# Patient Record
Sex: MALE | Race: WHITE | Employment: OTHER | URBAN - METROPOLITAN AREA
[De-identification: names, ages, dates, MRNs, and addresses within clinical notes are randomized per-mention and may not be internally consistent; named-entity substitution may affect disease eponyms.]

---

## 2017-11-21 ENCOUNTER — HOSPITAL ENCOUNTER (OUTPATIENT)
Age: 77
Discharge: HOME | End: 2017-11-21
Payer: MEDICARE

## 2017-11-21 ENCOUNTER — HOSPITAL ENCOUNTER (OUTPATIENT)
Dept: VASCULAR LAB | Age: 77
Discharge: HOME | End: 2017-11-21
Payer: MEDICARE

## 2017-11-21 LAB
ABSOLUTE EOS #: 0.2 K/UL (ref 0–0.4)
ABSOLUTE IMMATURE GRANULOCYTE: ABNORMAL K/UL (ref 0–0.3)
ABSOLUTE LYMPH #: 0.8 K/UL (ref 1–4.8)
ABSOLUTE MONO #: 0.6 K/UL (ref 0.1–1.3)
ABSOLUTE RETIC #: 0.02 M/UL (ref 0.02–0.1)
ANION GAP SERPL CALCULATED.3IONS-SCNC: 13 MMOL/L (ref 9–17)
BASOPHILS # BLD: 1 %
BASOPHILS ABSOLUTE: 0.1 K/UL (ref 0–0.2)
BNP INTERPRETATION: ABNORMAL
BUN BLDV-MCNC: 25 MG/DL (ref 8–23)
CHLORIDE BLD-SCNC: 100 MMOL/L (ref 98–107)
CO2: 28 MMOL/L (ref 20–31)
CREAT SERPL-MCNC: 1.43 MG/DL (ref 0.7–1.2)
DIFFERENTIAL TYPE: ABNORMAL
EOSINOPHILS RELATIVE PERCENT: 3 %
FERRITIN: 51 UG/L (ref 30–400)
FOLATE: 15 NG/ML
GFR AFRICAN AMERICAN: 58 ML/MIN
GFR NON-AFRICAN AMERICAN: 48 ML/MIN
GFR SERPL CREATININE-BSD FRML MDRD: ABNORMAL ML/MIN/{1.73_M2}
GFR SERPL CREATININE-BSD FRML MDRD: ABNORMAL ML/MIN/{1.73_M2}
HCT VFR BLD CALC: 34.6 % (ref 41–53)
HEMOGLOBIN: 11.7 G/DL (ref 13.5–17.5)
IMMATURE GRANULOCYTES: ABNORMAL %
IMMATURE RETIC FRACT: ABNORMAL %
INR BLD: 4.5
IRON SATURATION: 13 % (ref 20–55)
IRON: 39 UG/DL (ref 59–158)
LYMPHOCYTES # BLD: 11 %
MCH RBC QN AUTO: 30.8 PG (ref 26–34)
MCHC RBC AUTO-ENTMCNC: 33.9 G/DL (ref 31–37)
MCV RBC AUTO: 91.1 FL (ref 80–100)
MONOCYTES # BLD: 9 %
PDW BLD-RTO: 16.1 % (ref 11.5–14.9)
PLATELET # BLD: 167 K/UL (ref 150–450)
PLATELET ESTIMATE: ABNORMAL
PMV BLD AUTO: 8.4 FL (ref 6–12)
POTASSIUM SERPL-SCNC: 4.6 MMOL/L (ref 3.7–5.3)
PRO-BNP: 1884 PG/ML
PROTHROMBIN TIME: 52.9 SEC (ref 9.7–12)
RBC # BLD: 3.8 M/UL (ref 4.5–5.9)
RBC # BLD: ABNORMAL 10*6/UL
RETIC %: 0.5 % (ref 0.5–2)
RETIC HEMOGLOBIN: ABNORMAL PG (ref 28.2–35.7)
SEG NEUTROPHILS: 76 %
SEGMENTED NEUTROPHILS ABSOLUTE COUNT: 5.7 K/UL (ref 1.3–9.1)
SODIUM BLD-SCNC: 141 MMOL/L (ref 135–144)
THYROXINE, FREE: 1.44 NG/DL (ref 0.93–1.7)
TOTAL IRON BINDING CAPACITY: 301 UG/DL (ref 250–450)
TSH SERPL DL<=0.05 MIU/L-ACNC: 2.86 MIU/L (ref 0.3–5)
UNSATURATED IRON BINDING CAPACITY: 262 UG/DL (ref 112–347)
VITAMIN B-12: 806 PG/ML (ref 211–946)
WBC # BLD: 7.4 K/UL (ref 3.5–11)
WBC # BLD: ABNORMAL 10*3/UL

## 2017-11-21 PROCEDURE — 84520 ASSAY OF UREA NITROGEN: CPT

## 2017-11-21 PROCEDURE — 85045 AUTOMATED RETICULOCYTE COUNT: CPT

## 2017-11-21 PROCEDURE — 93978 VASCULAR STUDY: CPT

## 2017-11-21 PROCEDURE — 84443 ASSAY THYROID STIM HORMONE: CPT

## 2017-11-21 PROCEDURE — 82607 VITAMIN B-12: CPT

## 2017-11-21 PROCEDURE — 80051 ELECTROLYTE PANEL: CPT

## 2017-11-21 PROCEDURE — 82746 ASSAY OF FOLIC ACID SERUM: CPT

## 2017-11-21 PROCEDURE — 82728 ASSAY OF FERRITIN: CPT

## 2017-11-21 PROCEDURE — 83550 IRON BINDING TEST: CPT

## 2017-11-21 PROCEDURE — 85610 PROTHROMBIN TIME: CPT

## 2017-11-21 PROCEDURE — 85025 COMPLETE CBC W/AUTO DIFF WBC: CPT

## 2017-11-21 PROCEDURE — 83540 ASSAY OF IRON: CPT

## 2017-11-21 PROCEDURE — 36415 COLL VENOUS BLD VENIPUNCTURE: CPT

## 2017-11-21 PROCEDURE — 84439 ASSAY OF FREE THYROXINE: CPT

## 2017-11-21 PROCEDURE — 82565 ASSAY OF CREATININE: CPT

## 2017-11-21 PROCEDURE — 83880 ASSAY OF NATRIURETIC PEPTIDE: CPT

## 2017-11-28 ENCOUNTER — HOSPITAL ENCOUNTER (OUTPATIENT)
Age: 77
Discharge: HOME | End: 2017-11-28
Payer: MEDICARE

## 2017-11-28 LAB
INR BLD: 6
PROTHROMBIN TIME: 72 SEC (ref 9.7–12)

## 2017-11-28 PROCEDURE — 85610 PROTHROMBIN TIME: CPT

## 2017-11-28 PROCEDURE — 36415 COLL VENOUS BLD VENIPUNCTURE: CPT

## 2017-11-29 ENCOUNTER — HOSPITAL ENCOUNTER (OUTPATIENT)
Age: 77
Discharge: HOME | End: 2017-11-29
Payer: MEDICARE

## 2017-11-29 LAB
ABSOLUTE EOS #: 0.2 K/UL (ref 0–0.4)
ABSOLUTE IMMATURE GRANULOCYTE: ABNORMAL K/UL (ref 0–0.3)
ABSOLUTE LYMPH #: 0.8 K/UL (ref 1–4.8)
ABSOLUTE MONO #: 0.7 K/UL (ref 0.1–1.3)
BASOPHILS # BLD: 0 % (ref 0–2)
BASOPHILS ABSOLUTE: 0 K/UL (ref 0–0.2)
DIFFERENTIAL TYPE: ABNORMAL
EOSINOPHILS RELATIVE PERCENT: 2 % (ref 0–4)
HCT VFR BLD CALC: 33.3 % (ref 41–53)
HEMOGLOBIN: 11.2 G/DL (ref 13.5–17.5)
IMMATURE GRANULOCYTES: ABNORMAL %
INR BLD: 1.9
LYMPHOCYTES # BLD: 11 % (ref 24–44)
MCH RBC QN AUTO: 30.7 PG (ref 26–34)
MCHC RBC AUTO-ENTMCNC: 33.5 G/DL (ref 31–37)
MCV RBC AUTO: 91.6 FL (ref 80–100)
MONOCYTES # BLD: 10 % (ref 1–7)
PDW BLD-RTO: 16.3 % (ref 11.5–14.9)
PLATELET # BLD: 181 K/UL (ref 150–450)
PLATELET ESTIMATE: ABNORMAL
PMV BLD AUTO: 8.5 FL (ref 6–12)
PROTHROMBIN TIME: 21.3 SEC (ref 9.7–12)
RBC # BLD: 3.64 M/UL (ref 4.5–5.9)
RBC # BLD: ABNORMAL 10*6/UL
SEG NEUTROPHILS: 77 % (ref 36–66)
SEGMENTED NEUTROPHILS ABSOLUTE COUNT: 5.8 K/UL (ref 1.3–9.1)
WBC # BLD: 7.6 K/UL (ref 3.5–11)
WBC # BLD: ABNORMAL 10*3/UL

## 2017-11-29 PROCEDURE — 85025 COMPLETE CBC W/AUTO DIFF WBC: CPT

## 2017-11-29 PROCEDURE — 36415 COLL VENOUS BLD VENIPUNCTURE: CPT

## 2017-11-29 PROCEDURE — 85610 PROTHROMBIN TIME: CPT

## 2017-12-01 ENCOUNTER — HOSPITAL ENCOUNTER (OUTPATIENT)
Age: 77
Discharge: HOME | End: 2017-12-01
Payer: MEDICARE

## 2017-12-01 LAB
INR BLD: 1.3
PROTHROMBIN TIME: 13.7 SEC (ref 9.7–12)

## 2017-12-01 PROCEDURE — 85610 PROTHROMBIN TIME: CPT

## 2017-12-01 PROCEDURE — 36415 COLL VENOUS BLD VENIPUNCTURE: CPT

## 2017-12-07 ENCOUNTER — HOSPITAL ENCOUNTER (OUTPATIENT)
Age: 77
Discharge: HOME | End: 2017-12-07
Payer: MEDICARE

## 2017-12-07 LAB
INR BLD: 1.9
PROTHROMBIN TIME: 20.9 SEC (ref 9.7–12)

## 2017-12-07 PROCEDURE — 85610 PROTHROMBIN TIME: CPT

## 2017-12-07 PROCEDURE — 36415 COLL VENOUS BLD VENIPUNCTURE: CPT

## 2017-12-14 ENCOUNTER — HOSPITAL ENCOUNTER (OUTPATIENT)
Age: 77
Discharge: HOME | End: 2017-12-14
Payer: MEDICARE

## 2017-12-14 LAB
INR BLD: 1.3
PROTHROMBIN TIME: 14.3 SEC (ref 9.7–12)

## 2017-12-14 PROCEDURE — 36415 COLL VENOUS BLD VENIPUNCTURE: CPT

## 2017-12-14 PROCEDURE — 85610 PROTHROMBIN TIME: CPT

## 2017-12-21 ENCOUNTER — HOSPITAL ENCOUNTER (OUTPATIENT)
Age: 77
Discharge: HOME | End: 2017-12-21
Payer: MEDICARE

## 2017-12-21 LAB
INR BLD: 1.4
PROTHROMBIN TIME: 15.7 SEC (ref 9.7–12)

## 2017-12-21 PROCEDURE — 85610 PROTHROMBIN TIME: CPT

## 2017-12-21 PROCEDURE — 36415 COLL VENOUS BLD VENIPUNCTURE: CPT

## 2017-12-26 ENCOUNTER — HOSPITAL ENCOUNTER (OUTPATIENT)
Age: 77
Discharge: HOME | End: 2017-12-26
Payer: MEDICARE

## 2017-12-26 LAB
INR BLD: 2.7
PROTHROMBIN TIME: 31 SEC (ref 9.7–12)

## 2017-12-26 PROCEDURE — 85610 PROTHROMBIN TIME: CPT

## 2017-12-26 PROCEDURE — 36415 COLL VENOUS BLD VENIPUNCTURE: CPT

## 2018-01-02 ENCOUNTER — HOSPITAL ENCOUNTER (OUTPATIENT)
Age: 78
Discharge: HOME | End: 2018-01-02
Payer: MEDICARE

## 2018-01-02 LAB
INR BLD: 3.8
PROTHROMBIN TIME: 44.6 SEC (ref 9.7–12)

## 2018-01-02 PROCEDURE — 36415 COLL VENOUS BLD VENIPUNCTURE: CPT

## 2018-01-02 PROCEDURE — 85610 PROTHROMBIN TIME: CPT

## 2018-01-09 ENCOUNTER — HOSPITAL ENCOUNTER (OUTPATIENT)
Age: 78
Discharge: HOME | End: 2018-01-09
Payer: MEDICARE

## 2018-01-09 LAB
INR BLD: 1.6
PROTHROMBIN TIME: 17.2 SEC (ref 9.7–12)

## 2018-01-09 PROCEDURE — 85610 PROTHROMBIN TIME: CPT

## 2018-01-09 PROCEDURE — 36415 COLL VENOUS BLD VENIPUNCTURE: CPT

## 2018-01-16 ENCOUNTER — HOSPITAL ENCOUNTER (OUTPATIENT)
Age: 78
Discharge: HOME | End: 2018-01-16
Payer: MEDICARE

## 2018-01-16 LAB
INR BLD: 1.6
PROTHROMBIN TIME: 17 SEC (ref 9.7–12)

## 2018-01-16 PROCEDURE — 36415 COLL VENOUS BLD VENIPUNCTURE: CPT

## 2018-01-16 PROCEDURE — 85610 PROTHROMBIN TIME: CPT

## 2018-01-22 ENCOUNTER — HOSPITAL ENCOUNTER (OUTPATIENT)
Age: 78
Discharge: HOME | End: 2018-01-22
Payer: MEDICARE

## 2018-01-22 LAB
INR BLD: 1.5
PROTHROMBIN TIME: 15.6 SEC (ref 9.7–12)

## 2018-01-22 PROCEDURE — 85610 PROTHROMBIN TIME: CPT

## 2018-01-22 PROCEDURE — 36415 COLL VENOUS BLD VENIPUNCTURE: CPT

## 2018-01-30 ENCOUNTER — HOSPITAL ENCOUNTER (OUTPATIENT)
Age: 78
Discharge: HOME | End: 2018-01-30
Payer: MEDICARE

## 2018-01-30 LAB
INR BLD: 2.5
PROTHROMBIN TIME: 24.9 SEC (ref 9.7–12)

## 2018-01-30 PROCEDURE — 36415 COLL VENOUS BLD VENIPUNCTURE: CPT

## 2018-01-30 PROCEDURE — 85610 PROTHROMBIN TIME: CPT

## 2018-02-06 ENCOUNTER — HOSPITAL ENCOUNTER (OUTPATIENT)
Age: 78
Discharge: HOME | End: 2018-02-06
Payer: MEDICARE

## 2018-02-06 LAB
INR BLD: 1.5
PROTHROMBIN TIME: 15.7 SEC (ref 9.7–12)

## 2018-02-06 PROCEDURE — 85610 PROTHROMBIN TIME: CPT

## 2018-02-06 PROCEDURE — 36415 COLL VENOUS BLD VENIPUNCTURE: CPT

## 2018-02-13 ENCOUNTER — HOSPITAL ENCOUNTER (OUTPATIENT)
Age: 78
Discharge: HOME | End: 2018-02-13
Payer: MEDICARE

## 2018-02-13 LAB
INR BLD: 2.9
PROTHROMBIN TIME: 28.8 SEC (ref 9.7–12)

## 2018-02-13 PROCEDURE — 85610 PROTHROMBIN TIME: CPT

## 2018-02-13 PROCEDURE — 36415 COLL VENOUS BLD VENIPUNCTURE: CPT

## 2018-02-21 ENCOUNTER — HOSPITAL ENCOUNTER (OUTPATIENT)
Age: 78
Discharge: HOME | End: 2018-02-21
Payer: MEDICARE

## 2018-02-21 LAB
INR BLD: 2.4
PROTHROMBIN TIME: 24 SEC (ref 9.7–12)

## 2018-02-21 PROCEDURE — 36415 COLL VENOUS BLD VENIPUNCTURE: CPT

## 2018-02-21 PROCEDURE — 85610 PROTHROMBIN TIME: CPT

## 2018-02-27 ENCOUNTER — HOSPITAL ENCOUNTER (OUTPATIENT)
Age: 78
Discharge: HOME | End: 2018-02-27
Payer: MEDICARE

## 2018-02-27 LAB
BUN BLDV-MCNC: 24 MG/DL (ref 8–23)
CREAT SERPL-MCNC: 1.2 MG/DL (ref 0.7–1.2)
GFR AFRICAN AMERICAN: >60 ML/MIN
GFR NON-AFRICAN AMERICAN: 58 ML/MIN
GFR SERPL CREATININE-BSD FRML MDRD: ABNORMAL ML/MIN/{1.73_M2}
GFR SERPL CREATININE-BSD FRML MDRD: ABNORMAL ML/MIN/{1.73_M2}
INR BLD: 1.3
PROTHROMBIN TIME: 13.4 SEC (ref 9.7–12)

## 2018-02-27 PROCEDURE — 84520 ASSAY OF UREA NITROGEN: CPT

## 2018-02-27 PROCEDURE — 36415 COLL VENOUS BLD VENIPUNCTURE: CPT

## 2018-02-27 PROCEDURE — 82565 ASSAY OF CREATININE: CPT

## 2018-02-27 PROCEDURE — 85610 PROTHROMBIN TIME: CPT

## 2018-03-06 ENCOUNTER — HOSPITAL ENCOUNTER (OUTPATIENT)
Age: 78
Discharge: HOME | End: 2018-03-06
Payer: MEDICARE

## 2018-03-06 LAB
INR BLD: 2.2
PROTHROMBIN TIME: 21.7 SEC (ref 9.7–12)

## 2018-03-06 PROCEDURE — 36415 COLL VENOUS BLD VENIPUNCTURE: CPT

## 2018-03-06 PROCEDURE — 85610 PROTHROMBIN TIME: CPT

## 2018-03-13 ENCOUNTER — HOSPITAL ENCOUNTER (OUTPATIENT)
Age: 78
Discharge: HOME | End: 2018-03-13
Payer: MEDICARE

## 2018-03-13 LAB
INR BLD: 4.1
PROTHROMBIN TIME: 40.1 SEC (ref 9.7–12)

## 2018-03-13 PROCEDURE — 36415 COLL VENOUS BLD VENIPUNCTURE: CPT

## 2018-03-13 PROCEDURE — 85610 PROTHROMBIN TIME: CPT

## 2018-03-20 ENCOUNTER — HOSPITAL ENCOUNTER (OUTPATIENT)
Age: 78
Discharge: HOME | End: 2018-03-20
Payer: MEDICARE

## 2018-03-20 LAB
INR BLD: 3
PROTHROMBIN TIME: 29.3 SEC (ref 9.7–12)

## 2018-03-20 PROCEDURE — 36415 COLL VENOUS BLD VENIPUNCTURE: CPT

## 2018-03-20 PROCEDURE — 85610 PROTHROMBIN TIME: CPT

## 2018-04-03 ENCOUNTER — HOSPITAL ENCOUNTER (OUTPATIENT)
Age: 78
Discharge: HOME | End: 2018-04-03
Payer: MEDICARE

## 2018-04-03 LAB
INR BLD: 2.6
PROTHROMBIN TIME: 25.4 SEC (ref 9.7–12)

## 2018-04-03 PROCEDURE — 85610 PROTHROMBIN TIME: CPT

## 2018-04-03 PROCEDURE — 36415 COLL VENOUS BLD VENIPUNCTURE: CPT

## 2018-04-17 ENCOUNTER — HOSPITAL ENCOUNTER (OUTPATIENT)
Age: 78
Discharge: HOME | End: 2018-04-17
Payer: MEDICARE

## 2018-04-17 LAB
INR BLD: 3.2
PROTHROMBIN TIME: 31.4 SEC (ref 9.7–12)

## 2018-04-17 PROCEDURE — 36415 COLL VENOUS BLD VENIPUNCTURE: CPT

## 2018-04-17 PROCEDURE — 85610 PROTHROMBIN TIME: CPT

## 2018-05-08 ENCOUNTER — HOSPITAL ENCOUNTER (OUTPATIENT)
Age: 78
Discharge: HOME | End: 2018-05-08
Payer: MEDICARE

## 2018-05-08 LAB
INR BLD: 1.2
PROTHROMBIN TIME: 12.3 SEC (ref 9.7–12)

## 2018-05-08 PROCEDURE — 36415 COLL VENOUS BLD VENIPUNCTURE: CPT

## 2018-05-08 PROCEDURE — 85610 PROTHROMBIN TIME: CPT

## 2018-05-10 ENCOUNTER — HOSPITAL ENCOUNTER (OUTPATIENT)
Age: 78
Discharge: HOME | End: 2018-05-10
Payer: MEDICARE

## 2018-05-10 LAB
INR BLD: 1.1
PROTHROMBIN TIME: 11.8 SEC (ref 9.7–12)

## 2018-05-10 PROCEDURE — 36415 COLL VENOUS BLD VENIPUNCTURE: CPT

## 2018-05-10 PROCEDURE — 85610 PROTHROMBIN TIME: CPT

## 2018-10-23 ENCOUNTER — HOSPITAL ENCOUNTER (OUTPATIENT)
Age: 78
Setting detail: SPECIMEN
Discharge: HOME | End: 2018-10-23
Payer: MEDICARE

## 2018-10-25 LAB
APPEARANCE FLUID: NORMAL
BASO FLUID: NORMAL %
COLOR FLUID: NORMAL
CRYSTALS, FLUID: POSITIVE
EOSINOPHIL FLUID: NORMAL %
FLUID DIFF COMMENT: NORMAL
LYMPHOCYTES, BODY FLUID: NORMAL %
MONOCYTE, FLUID: NORMAL %
NEUTROPHIL, FLUID: NORMAL %
OTHER CELLS FLUID: NORMAL %
RBC FLUID: NORMAL /MM3
SPECIMEN TYPE: ABNORMAL
SPECIMEN TYPE: NORMAL
WBC FLUID: 2280 /MM3

## 2018-10-28 LAB
CULTURE: ABNORMAL
DIRECT EXAM: ABNORMAL
DIRECT EXAM: ABNORMAL
Lab: ABNORMAL
SPECIMEN DESCRIPTION: ABNORMAL
STATUS: ABNORMAL

## 2018-11-01 ENCOUNTER — HOSPITAL ENCOUNTER (OUTPATIENT)
Dept: NON INVASIVE DIAGNOSTICS | Age: 78
Discharge: HOME | End: 2018-11-01
Payer: MEDICARE

## 2018-11-01 ENCOUNTER — HOSPITAL ENCOUNTER (OUTPATIENT)
Dept: NUCLEAR MEDICINE | Age: 78
Discharge: HOME | End: 2018-11-03
Payer: MEDICARE

## 2018-11-01 VITALS — BODY MASS INDEX: 26.95 KG/M2 | WEIGHT: 210 LBS | HEIGHT: 74 IN

## 2018-11-01 DIAGNOSIS — I10 ESSENTIAL HYPERTENSION, MALIGNANT: ICD-10-CM

## 2018-11-01 DIAGNOSIS — R94.31 ABNORMAL ELECTROCARDIOGRAM: ICD-10-CM

## 2018-11-01 DIAGNOSIS — E78.00 PURE HYPERCHOLESTEROLEMIA: ICD-10-CM

## 2018-11-01 DIAGNOSIS — I48.91 ATRIAL FIBRILLATION, UNSPECIFIED TYPE (HCC): ICD-10-CM

## 2018-11-01 LAB
LV EF: 30 %
LVEF MODALITY: NORMAL

## 2018-11-01 PROCEDURE — 3430000000 HC RX DIAGNOSTIC RADIOPHARMACEUTICAL: Performed by: FAMILY MEDICINE

## 2018-11-01 PROCEDURE — 78452 HT MUSCLE IMAGE SPECT MULT: CPT

## 2018-11-01 PROCEDURE — 6360000002 HC RX W HCPCS: Performed by: FAMILY MEDICINE

## 2018-11-01 PROCEDURE — 2580000003 HC RX 258: Performed by: FAMILY MEDICINE

## 2018-11-01 PROCEDURE — A9500 TC99M SESTAMIBI: HCPCS | Performed by: FAMILY MEDICINE

## 2018-11-01 PROCEDURE — 93017 CV STRESS TEST TRACING ONLY: CPT

## 2018-11-01 RX ORDER — METOPROLOL TARTRATE 5 MG/5ML
2.5 INJECTION INTRAVENOUS PRN
Status: ACTIVE | OUTPATIENT
Start: 2018-11-01 | End: 2018-11-02

## 2018-11-01 RX ORDER — SODIUM CHLORIDE 0.9 % (FLUSH) 0.9 %
10 SYRINGE (ML) INJECTION PRN
Status: DISCONTINUED | OUTPATIENT
Start: 2018-11-01 | End: 2018-11-04 | Stop reason: HOSPADM

## 2018-11-01 RX ORDER — NITROGLYCERIN 0.4 MG/1
0.4 TABLET SUBLINGUAL EVERY 5 MIN PRN
Status: ACTIVE | OUTPATIENT
Start: 2018-11-01 | End: 2018-11-02

## 2018-11-01 RX ORDER — SODIUM CHLORIDE 0.9 % (FLUSH) 0.9 %
10 SYRINGE (ML) INJECTION PRN
Status: ACTIVE | OUTPATIENT
Start: 2018-11-01 | End: 2018-11-02

## 2018-11-01 RX ORDER — AMINOPHYLLINE DIHYDRATE 25 MG/ML
100 INJECTION, SOLUTION INTRAVENOUS
Status: ACTIVE | OUTPATIENT
Start: 2018-11-01 | End: 2018-11-01

## 2018-11-01 RX ORDER — 0.9 % SODIUM CHLORIDE 0.9 %
250 INTRAVENOUS SOLUTION INTRAVENOUS ONCE
Status: DISCONTINUED | OUTPATIENT
Start: 2018-11-01 | End: 2018-11-04 | Stop reason: HOSPADM

## 2018-11-01 RX ADMIN — Medication 10 ML: at 07:29

## 2018-11-01 RX ADMIN — REGADENOSON 0.4 MG: 0.08 INJECTION, SOLUTION INTRAVENOUS at 09:04

## 2018-11-01 RX ADMIN — Medication 10 ML: at 09:04

## 2018-11-01 RX ADMIN — TETRAKIS(2-METHOXYISOBUTYLISOCYANIDE)COPPER(I) TETRAFLUOROBORATE 36.2 MILLICURIE: 1 INJECTION, POWDER, LYOPHILIZED, FOR SOLUTION INTRAVENOUS at 09:05

## 2018-11-01 RX ADMIN — TETRAKIS(2-METHOXYISOBUTYLISOCYANIDE)COPPER(I) TETRAFLUOROBORATE 11.1 MILLICURIE: 1 INJECTION, POWDER, LYOPHILIZED, FOR SOLUTION INTRAVENOUS at 07:29

## 2018-11-01 RX ADMIN — Medication 10 ML: at 08:29

## 2019-09-12 ENCOUNTER — HOSPITAL ENCOUNTER (OUTPATIENT)
Age: 79
Discharge: HOME | End: 2019-09-12
Payer: MEDICARE

## 2019-09-12 LAB
INR BLD: 2
PROTHROMBIN TIME: 22.7 SEC (ref 11.8–14.6)

## 2019-09-12 PROCEDURE — 36415 COLL VENOUS BLD VENIPUNCTURE: CPT

## 2019-09-12 PROCEDURE — 85610 PROTHROMBIN TIME: CPT

## 2019-09-19 ENCOUNTER — HOSPITAL ENCOUNTER (OUTPATIENT)
Age: 79
Discharge: HOME | End: 2019-09-19
Payer: MEDICARE

## 2019-09-19 LAB
INR BLD: 2.3
PROTHROMBIN TIME: 25.8 SEC (ref 11.8–14.6)

## 2019-09-19 PROCEDURE — 85610 PROTHROMBIN TIME: CPT

## 2019-09-19 PROCEDURE — 36415 COLL VENOUS BLD VENIPUNCTURE: CPT

## 2019-10-23 ENCOUNTER — HOSPITAL ENCOUNTER (OUTPATIENT)
Age: 79
Discharge: HOME | End: 2019-10-23
Payer: MEDICARE

## 2019-10-23 ENCOUNTER — HOSPITAL ENCOUNTER (OUTPATIENT)
Dept: ULTRASOUND IMAGING | Age: 79
Discharge: HOME | End: 2019-10-25
Payer: MEDICARE

## 2019-10-23 DIAGNOSIS — R10.11 RUQ PAIN: ICD-10-CM

## 2019-10-23 LAB
ABSOLUTE EOS #: 0.1 K/UL (ref 0–0.4)
ABSOLUTE IMMATURE GRANULOCYTE: ABNORMAL K/UL (ref 0–0.3)
ABSOLUTE LYMPH #: 1.1 K/UL (ref 1–4.8)
ABSOLUTE MONO #: 0.7 K/UL (ref 0.1–1.3)
ALBUMIN SERPL-MCNC: 3.8 G/DL (ref 3.5–5.2)
ALBUMIN/GLOBULIN RATIO: ABNORMAL (ref 1–2.5)
ALP BLD-CCNC: 89 U/L (ref 40–129)
ALT SERPL-CCNC: 10 U/L (ref 5–41)
AMYLASE: 42 U/L (ref 28–100)
ANION GAP SERPL CALCULATED.3IONS-SCNC: 12 MMOL/L (ref 9–17)
AST SERPL-CCNC: 13 U/L
BASOPHILS # BLD: 1 % (ref 0–2)
BASOPHILS ABSOLUTE: 0.1 K/UL (ref 0–0.2)
BILIRUB SERPL-MCNC: 0.86 MG/DL (ref 0.3–1.2)
BILIRUBIN DIRECT: 0.32 MG/DL
BILIRUBIN, INDIRECT: 0.54 MG/DL (ref 0–1)
BUN BLDV-MCNC: 24 MG/DL (ref 8–23)
BUN/CREAT BLD: ABNORMAL (ref 9–20)
CALCIUM SERPL-MCNC: 9.7 MG/DL (ref 8.6–10.4)
CHLORIDE BLD-SCNC: 101 MMOL/L (ref 98–107)
CO2: 24 MMOL/L (ref 20–31)
CREAT SERPL-MCNC: 1.23 MG/DL (ref 0.7–1.2)
DIFFERENTIAL TYPE: ABNORMAL
EOSINOPHILS RELATIVE PERCENT: 1 % (ref 0–4)
GFR AFRICAN AMERICAN: >60 ML/MIN
GFR NON-AFRICAN AMERICAN: 56 ML/MIN
GFR SERPL CREATININE-BSD FRML MDRD: ABNORMAL ML/MIN/{1.73_M2}
GFR SERPL CREATININE-BSD FRML MDRD: ABNORMAL ML/MIN/{1.73_M2}
GLOBULIN: ABNORMAL G/DL (ref 1.5–3.8)
GLUCOSE BLD-MCNC: 136 MG/DL (ref 70–99)
HCT VFR BLD CALC: 33.3 % (ref 41–53)
HEMOGLOBIN: 11.2 G/DL (ref 13.5–17.5)
IMMATURE GRANULOCYTES: ABNORMAL %
INR BLD: 5.5
LIPASE: 28 U/L (ref 13–60)
LYMPHOCYTES # BLD: 12 % (ref 24–44)
MCH RBC QN AUTO: 30.1 PG (ref 26–34)
MCHC RBC AUTO-ENTMCNC: 33.6 G/DL (ref 31–37)
MCV RBC AUTO: 89.6 FL (ref 80–100)
MONOCYTES # BLD: 8 % (ref 1–7)
NRBC AUTOMATED: ABNORMAL PER 100 WBC
PDW BLD-RTO: 15.6 % (ref 11.5–14.9)
PLATELET # BLD: 313 K/UL (ref 150–450)
PLATELET ESTIMATE: ABNORMAL
PMV BLD AUTO: 8.1 FL (ref 6–12)
POTASSIUM SERPL-SCNC: 5.3 MMOL/L (ref 3.7–5.3)
PROTHROMBIN TIME: 50.4 SEC (ref 11.8–14.6)
RBC # BLD: 3.71 M/UL (ref 4.5–5.9)
RBC # BLD: ABNORMAL 10*6/UL
SEG NEUTROPHILS: 78 % (ref 36–66)
SEGMENTED NEUTROPHILS ABSOLUTE COUNT: 7.2 K/UL (ref 1.3–9.1)
SODIUM BLD-SCNC: 137 MMOL/L (ref 135–144)
TOTAL PROTEIN: 7.6 G/DL (ref 6.4–8.3)
WBC # BLD: 9.3 K/UL (ref 3.5–11)
WBC # BLD: ABNORMAL 10*3/UL

## 2019-10-23 PROCEDURE — 80076 HEPATIC FUNCTION PANEL: CPT

## 2019-10-23 PROCEDURE — 85610 PROTHROMBIN TIME: CPT

## 2019-10-23 PROCEDURE — 80048 BASIC METABOLIC PNL TOTAL CA: CPT

## 2019-10-23 PROCEDURE — 82150 ASSAY OF AMYLASE: CPT

## 2019-10-23 PROCEDURE — 85025 COMPLETE CBC W/AUTO DIFF WBC: CPT

## 2019-10-23 PROCEDURE — 36415 COLL VENOUS BLD VENIPUNCTURE: CPT

## 2019-10-23 PROCEDURE — 83690 ASSAY OF LIPASE: CPT

## 2019-10-23 PROCEDURE — 76705 ECHO EXAM OF ABDOMEN: CPT

## 2019-10-30 ENCOUNTER — HOSPITAL ENCOUNTER (OUTPATIENT)
Dept: CT IMAGING | Age: 79
Discharge: HOME | End: 2019-11-01
Payer: MEDICARE

## 2019-10-30 DIAGNOSIS — R10.11 RUQ ABDOMINAL PAIN: ICD-10-CM

## 2019-10-30 PROCEDURE — 2580000003 HC RX 258: Performed by: FAMILY MEDICINE

## 2019-10-30 PROCEDURE — 74170 CT ABD WO CNTRST FLWD CNTRST: CPT

## 2019-10-30 PROCEDURE — 6360000004 HC RX CONTRAST MEDICATION: Performed by: FAMILY MEDICINE

## 2019-10-30 RX ORDER — SODIUM CHLORIDE 0.9 % (FLUSH) 0.9 %
10 SYRINGE (ML) INJECTION PRN
Status: DISCONTINUED | OUTPATIENT
Start: 2019-10-30 | End: 2019-11-02 | Stop reason: HOSPADM

## 2019-10-30 RX ORDER — 0.9 % SODIUM CHLORIDE 0.9 %
80 INTRAVENOUS SOLUTION INTRAVENOUS ONCE
Status: COMPLETED | OUTPATIENT
Start: 2019-10-30 | End: 2019-10-30

## 2019-10-30 RX ADMIN — SODIUM CHLORIDE 80 ML: 9 INJECTION, SOLUTION INTRAVENOUS at 15:15

## 2019-10-30 RX ADMIN — Medication 10 ML: at 15:15

## 2019-10-30 RX ADMIN — IOPAMIDOL 75 ML: 755 INJECTION, SOLUTION INTRAVENOUS at 15:15

## 2019-10-31 ENCOUNTER — HOSPITAL ENCOUNTER (OUTPATIENT)
Age: 79
Discharge: HOME | End: 2019-10-31
Payer: MEDICARE

## 2019-10-31 LAB
ABSOLUTE EOS #: 0.2 K/UL (ref 0–0.4)
ABSOLUTE IMMATURE GRANULOCYTE: ABNORMAL K/UL (ref 0–0.3)
ABSOLUTE LYMPH #: 1.5 K/UL (ref 1–4.8)
ABSOLUTE MONO #: 0.8 K/UL (ref 0.1–1.3)
ANION GAP SERPL CALCULATED.3IONS-SCNC: 13 MMOL/L (ref 9–17)
BASOPHILS # BLD: 1 % (ref 0–2)
BASOPHILS ABSOLUTE: 0.1 K/UL (ref 0–0.2)
BUN BLDV-MCNC: 24 MG/DL (ref 8–23)
BUN/CREAT BLD: ABNORMAL (ref 9–20)
CALCIUM SERPL-MCNC: 9.5 MG/DL (ref 8.6–10.4)
CHLORIDE BLD-SCNC: 101 MMOL/L (ref 98–107)
CO2: 24 MMOL/L (ref 20–31)
CREAT SERPL-MCNC: 1.27 MG/DL (ref 0.7–1.2)
DIFFERENTIAL TYPE: ABNORMAL
EOSINOPHILS RELATIVE PERCENT: 2 % (ref 0–4)
GFR AFRICAN AMERICAN: >60 ML/MIN
GFR NON-AFRICAN AMERICAN: 54 ML/MIN
GFR SERPL CREATININE-BSD FRML MDRD: ABNORMAL ML/MIN/{1.73_M2}
GFR SERPL CREATININE-BSD FRML MDRD: ABNORMAL ML/MIN/{1.73_M2}
GLUCOSE BLD-MCNC: 73 MG/DL (ref 70–99)
HCT VFR BLD CALC: 33.1 % (ref 41–53)
HEMOGLOBIN: 10.8 G/DL (ref 13.5–17.5)
IMMATURE GRANULOCYTES: ABNORMAL %
LYMPHOCYTES # BLD: 19 % (ref 24–44)
MCH RBC QN AUTO: 29.4 PG (ref 26–34)
MCHC RBC AUTO-ENTMCNC: 32.7 G/DL (ref 31–37)
MCV RBC AUTO: 90 FL (ref 80–100)
MONOCYTES # BLD: 10 % (ref 1–7)
NRBC AUTOMATED: ABNORMAL PER 100 WBC
PDW BLD-RTO: 16.7 % (ref 11.5–14.9)
PLATELET # BLD: 257 K/UL (ref 150–450)
PLATELET ESTIMATE: ABNORMAL
PMV BLD AUTO: 7.9 FL (ref 6–12)
POTASSIUM SERPL-SCNC: 4.6 MMOL/L (ref 3.7–5.3)
RBC # BLD: 3.67 M/UL (ref 4.5–5.9)
RBC # BLD: ABNORMAL 10*6/UL
SEG NEUTROPHILS: 68 % (ref 36–66)
SEGMENTED NEUTROPHILS ABSOLUTE COUNT: 5.4 K/UL (ref 1.3–9.1)
SODIUM BLD-SCNC: 138 MMOL/L (ref 135–144)
WBC # BLD: 7.8 K/UL (ref 3.5–11)
WBC # BLD: ABNORMAL 10*3/UL

## 2019-10-31 PROCEDURE — 80048 BASIC METABOLIC PNL TOTAL CA: CPT

## 2019-10-31 PROCEDURE — 85025 COMPLETE CBC W/AUTO DIFF WBC: CPT

## 2019-10-31 PROCEDURE — 36415 COLL VENOUS BLD VENIPUNCTURE: CPT

## 2019-11-04 ENCOUNTER — HOSPITAL ENCOUNTER (OUTPATIENT)
Age: 79
Discharge: HOME | End: 2019-11-04
Payer: MEDICARE

## 2019-11-04 LAB
ANION GAP SERPL CALCULATED.3IONS-SCNC: 12 MMOL/L (ref 9–17)
BILIRUBIN URINE: NEGATIVE
BUN BLDV-MCNC: 13 MG/DL (ref 8–23)
BUN/CREAT BLD: ABNORMAL (ref 9–20)
CALCIUM SERPL-MCNC: 9.3 MG/DL (ref 8.6–10.4)
CHLORIDE BLD-SCNC: 105 MMOL/L (ref 98–107)
CO2: 25 MMOL/L (ref 20–31)
COLOR: YELLOW
COMMENT UA: NORMAL
CREAT SERPL-MCNC: 1.15 MG/DL (ref 0.7–1.2)
GFR AFRICAN AMERICAN: >60 ML/MIN
GFR NON-AFRICAN AMERICAN: >60 ML/MIN
GFR SERPL CREATININE-BSD FRML MDRD: ABNORMAL ML/MIN/{1.73_M2}
GFR SERPL CREATININE-BSD FRML MDRD: ABNORMAL ML/MIN/{1.73_M2}
GLUCOSE BLD-MCNC: 129 MG/DL (ref 70–99)
GLUCOSE URINE: NEGATIVE
HCT VFR BLD CALC: 31 % (ref 41–53)
HEMOGLOBIN: 10.3 G/DL (ref 13.5–17.5)
INR BLD: 3.3
KETONES, URINE: NEGATIVE
LEUKOCYTE ESTERASE, URINE: NEGATIVE
MCH RBC QN AUTO: 30.1 PG (ref 26–34)
MCHC RBC AUTO-ENTMCNC: 33.4 G/DL (ref 31–37)
MCV RBC AUTO: 90.1 FL (ref 80–100)
NITRITE, URINE: NEGATIVE
NRBC AUTOMATED: ABNORMAL PER 100 WBC
PDW BLD-RTO: 16.5 % (ref 11.5–14.9)
PH UA: 5 (ref 5–8)
PLATELET # BLD: 211 K/UL (ref 150–450)
PMV BLD AUTO: 7.6 FL (ref 6–12)
POTASSIUM SERPL-SCNC: 4.9 MMOL/L (ref 3.7–5.3)
PROTEIN UA: NEGATIVE
PROTHROMBIN TIME: 34 SEC (ref 11.8–14.6)
RBC # BLD: 3.44 M/UL (ref 4.5–5.9)
SODIUM BLD-SCNC: 142 MMOL/L (ref 135–144)
SPECIFIC GRAVITY UA: 1.01 (ref 1–1.03)
TURBIDITY: CLEAR
URINE HGB: NEGATIVE
UROBILINOGEN, URINE: NORMAL
WBC # BLD: 7.2 K/UL (ref 3.5–11)

## 2019-11-04 PROCEDURE — 81003 URINALYSIS AUTO W/O SCOPE: CPT

## 2019-11-04 PROCEDURE — 85610 PROTHROMBIN TIME: CPT

## 2019-11-04 PROCEDURE — 80048 BASIC METABOLIC PNL TOTAL CA: CPT

## 2019-11-04 PROCEDURE — 36415 COLL VENOUS BLD VENIPUNCTURE: CPT

## 2019-11-04 PROCEDURE — 85027 COMPLETE CBC AUTOMATED: CPT

## 2019-12-20 ENCOUNTER — HOSPITAL ENCOUNTER (OUTPATIENT)
Dept: CT IMAGING | Age: 79
Discharge: HOME | End: 2019-12-22
Payer: MEDICARE
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
## 2019-12-20 PROCEDURE — 74176 CT ABD & PELVIS W/O CONTRAST: CPT

## 2020-01-10 ENCOUNTER — HOSPITAL ENCOUNTER (OUTPATIENT)
Age: 80
Discharge: HOME | End: 2020-01-10
Payer: MEDICARE

## 2020-01-10 LAB
INR BLD: 3.9
PROTHROMBIN TIME: 38.6 SEC (ref 11.8–14.6)

## 2020-01-10 PROCEDURE — 85610 PROTHROMBIN TIME: CPT

## 2020-01-10 PROCEDURE — 36415 COLL VENOUS BLD VENIPUNCTURE: CPT

## 2020-01-14 ENCOUNTER — HOSPITAL ENCOUNTER (OUTPATIENT)
Age: 80
Discharge: HOME | End: 2020-01-14
Payer: MEDICARE

## 2020-01-14 LAB
INR BLD: 1.3
PROTHROMBIN TIME: 16 SEC (ref 11.8–14.6)

## 2020-01-14 PROCEDURE — 36415 COLL VENOUS BLD VENIPUNCTURE: CPT

## 2020-01-14 PROCEDURE — 85610 PROTHROMBIN TIME: CPT

## 2020-03-04 ENCOUNTER — HOSPITAL ENCOUNTER (OUTPATIENT)
Age: 80
Discharge: HOME | End: 2020-03-04
Payer: MEDICARE

## 2020-03-04 LAB
ABSOLUTE EOS #: 0.2 K/UL (ref 0–0.4)
ABSOLUTE IMMATURE GRANULOCYTE: ABNORMAL K/UL (ref 0–0.3)
ABSOLUTE LYMPH #: 0.9 K/UL (ref 1–4.8)
ABSOLUTE MONO #: 0.6 K/UL (ref 0.1–1.3)
ALBUMIN SERPL-MCNC: 4.1 G/DL (ref 3.5–5.2)
ALBUMIN/GLOBULIN RATIO: ABNORMAL (ref 1–2.5)
ALP BLD-CCNC: 101 U/L (ref 40–129)
ALT SERPL-CCNC: 7 U/L (ref 5–41)
ANION GAP SERPL CALCULATED.3IONS-SCNC: 12 MMOL/L (ref 9–17)
AST SERPL-CCNC: 12 U/L
BASOPHILS # BLD: 1 % (ref 0–2)
BASOPHILS ABSOLUTE: 0 K/UL (ref 0–0.2)
BILIRUB SERPL-MCNC: 1.09 MG/DL (ref 0.3–1.2)
BILIRUBIN DIRECT: 0.32 MG/DL
BILIRUBIN, INDIRECT: 0.77 MG/DL (ref 0–1)
BUN BLDV-MCNC: 20 MG/DL (ref 8–23)
BUN/CREAT BLD: ABNORMAL (ref 9–20)
CALCIUM SERPL-MCNC: 10 MG/DL (ref 8.6–10.4)
CHLORIDE BLD-SCNC: 103 MMOL/L (ref 98–107)
CO2: 26 MMOL/L (ref 20–31)
CREAT SERPL-MCNC: 1.19 MG/DL (ref 0.7–1.2)
CREATININE URINE: 164.2 MG/DL (ref 39–259)
DIFFERENTIAL TYPE: ABNORMAL
EOSINOPHILS RELATIVE PERCENT: 3 % (ref 0–4)
GFR AFRICAN AMERICAN: >60 ML/MIN
GFR NON-AFRICAN AMERICAN: 59 ML/MIN
GFR SERPL CREATININE-BSD FRML MDRD: ABNORMAL ML/MIN/{1.73_M2}
GFR SERPL CREATININE-BSD FRML MDRD: ABNORMAL ML/MIN/{1.73_M2}
GLOBULIN: ABNORMAL G/DL (ref 1.5–3.8)
GLUCOSE BLD-MCNC: 138 MG/DL (ref 70–99)
HCT VFR BLD CALC: 35.3 % (ref 41–53)
HEMOGLOBIN: 11.6 G/DL (ref 13.5–17.5)
IMMATURE GRANULOCYTES: ABNORMAL %
LYMPHOCYTES # BLD: 14 % (ref 24–44)
MCH RBC QN AUTO: 27 PG (ref 26–34)
MCHC RBC AUTO-ENTMCNC: 32.9 G/DL (ref 31–37)
MCV RBC AUTO: 82.3 FL (ref 80–100)
MICROALBUMIN/CREAT 24H UR: <12 MG/L
MICROALBUMIN/CREAT UR-RTO: NORMAL MCG/MG CREAT
MONOCYTES # BLD: 10 % (ref 1–7)
NRBC AUTOMATED: ABNORMAL PER 100 WBC
PDW BLD-RTO: 16.9 % (ref 11.5–14.9)
PLATELET # BLD: 230 K/UL (ref 150–450)
PLATELET ESTIMATE: ABNORMAL
PMV BLD AUTO: 8.1 FL (ref 6–12)
POTASSIUM SERPL-SCNC: 4.8 MMOL/L (ref 3.7–5.3)
PROSTATE SPECIFIC ANTIGEN: 0.28 UG/L
RBC # BLD: 4.29 M/UL (ref 4.5–5.9)
RBC # BLD: ABNORMAL 10*6/UL
SEG NEUTROPHILS: 72 % (ref 36–66)
SEGMENTED NEUTROPHILS ABSOLUTE COUNT: 4.7 K/UL (ref 1.3–9.1)
SODIUM BLD-SCNC: 141 MMOL/L (ref 135–144)
THYROXINE, FREE: 1.25 NG/DL (ref 0.93–1.7)
TOTAL PROTEIN: 7.7 G/DL (ref 6.4–8.3)
TSH SERPL DL<=0.05 MIU/L-ACNC: 3.41 MIU/L (ref 0.3–5)
WBC # BLD: 6.5 K/UL (ref 3.5–11)
WBC # BLD: ABNORMAL 10*3/UL

## 2020-03-04 PROCEDURE — 82570 ASSAY OF URINE CREATININE: CPT

## 2020-03-04 PROCEDURE — 80048 BASIC METABOLIC PNL TOTAL CA: CPT

## 2020-03-04 PROCEDURE — 82043 UR ALBUMIN QUANTITATIVE: CPT

## 2020-03-04 PROCEDURE — 80076 HEPATIC FUNCTION PANEL: CPT

## 2020-03-04 PROCEDURE — G0103 PSA SCREENING: HCPCS

## 2020-03-04 PROCEDURE — 84439 ASSAY OF FREE THYROXINE: CPT

## 2020-03-04 PROCEDURE — 82652 VIT D 1 25-DIHYDROXY: CPT

## 2020-03-04 PROCEDURE — 84443 ASSAY THYROID STIM HORMONE: CPT

## 2020-03-04 PROCEDURE — 85025 COMPLETE CBC W/AUTO DIFF WBC: CPT

## 2020-03-04 PROCEDURE — 36415 COLL VENOUS BLD VENIPUNCTURE: CPT

## 2020-03-06 LAB — VITAMIN D 1,25-DIHYDROXY: 26 PG/ML (ref 19.9–79.3)

## 2020-06-09 ENCOUNTER — HOSPITAL ENCOUNTER (OUTPATIENT)
Age: 80
Setting detail: SPECIMEN
Discharge: HOME | End: 2020-06-09
Payer: MEDICARE

## 2020-06-09 ENCOUNTER — HOSPITAL ENCOUNTER (OUTPATIENT)
Dept: NUCLEAR MEDICINE | Age: 80
Discharge: HOME | End: 2020-06-11
Payer: MEDICARE

## 2020-06-09 ENCOUNTER — HOSPITAL ENCOUNTER (OUTPATIENT)
Dept: NON INVASIVE DIAGNOSTICS | Age: 80
Discharge: HOME | End: 2020-06-09
Payer: MEDICARE

## 2020-06-09 VITALS — HEIGHT: 74 IN | BODY MASS INDEX: 28.23 KG/M2 | WEIGHT: 220 LBS

## 2020-06-09 LAB
ABSOLUTE EOS #: 0.1 K/UL (ref 0–0.4)
ABSOLUTE IMMATURE GRANULOCYTE: ABNORMAL K/UL (ref 0–0.3)
ABSOLUTE LYMPH #: 1.1 K/UL (ref 1–4.8)
ABSOLUTE MONO #: 0.5 K/UL (ref 0.1–1.3)
ANION GAP SERPL CALCULATED.3IONS-SCNC: 12 MMOL/L (ref 9–17)
BASOPHILS # BLD: 1 % (ref 0–2)
BASOPHILS ABSOLUTE: 0 K/UL (ref 0–0.2)
BNP INTERPRETATION: ABNORMAL
BUN BLDV-MCNC: 29 MG/DL (ref 8–23)
BUN/CREAT BLD: ABNORMAL (ref 9–20)
CALCIUM SERPL-MCNC: 9.7 MG/DL (ref 8.6–10.4)
CHLORIDE BLD-SCNC: 105 MMOL/L (ref 98–107)
CO2: 26 MMOL/L (ref 20–31)
CREAT SERPL-MCNC: 1.43 MG/DL (ref 0.7–1.2)
DIFFERENTIAL TYPE: ABNORMAL
EOSINOPHILS RELATIVE PERCENT: 2 % (ref 0–4)
GFR AFRICAN AMERICAN: 57 ML/MIN
GFR NON-AFRICAN AMERICAN: 47 ML/MIN
GFR SERPL CREATININE-BSD FRML MDRD: ABNORMAL ML/MIN/{1.73_M2}
GFR SERPL CREATININE-BSD FRML MDRD: ABNORMAL ML/MIN/{1.73_M2}
GLUCOSE BLD-MCNC: 142 MG/DL (ref 70–99)
HCT VFR BLD CALC: 33.6 % (ref 41–53)
HEMOGLOBIN: 11.1 G/DL (ref 13.5–17.5)
IMMATURE GRANULOCYTES: ABNORMAL %
IRON SATURATION: 26 % (ref 20–55)
IRON: 68 UG/DL (ref 59–158)
LV EF: 35 %
LV EF: 48 %
LVEF MODALITY: NORMAL
LVEF MODALITY: NORMAL
LYMPHOCYTES # BLD: 16 % (ref 24–44)
MCH RBC QN AUTO: 29.8 PG (ref 26–34)
MCHC RBC AUTO-ENTMCNC: 33.1 G/DL (ref 31–37)
MCV RBC AUTO: 90 FL (ref 80–100)
MONOCYTES # BLD: 8 % (ref 1–7)
NRBC AUTOMATED: ABNORMAL PER 100 WBC
PDW BLD-RTO: 20.5 % (ref 11.5–14.9)
PLATELET # BLD: 167 K/UL (ref 150–450)
PLATELET ESTIMATE: ABNORMAL
PMV BLD AUTO: 7.7 FL (ref 6–12)
POTASSIUM SERPL-SCNC: 4.2 MMOL/L (ref 3.7–5.3)
PRO-BNP: 2683 PG/ML
RBC # BLD: 3.74 M/UL (ref 4.5–5.9)
RBC # BLD: ABNORMAL 10*6/UL
SEG NEUTROPHILS: 73 % (ref 36–66)
SEGMENTED NEUTROPHILS ABSOLUTE COUNT: 5.1 K/UL (ref 1.3–9.1)
SODIUM BLD-SCNC: 143 MMOL/L (ref 135–144)
TOTAL IRON BINDING CAPACITY: 261 UG/DL (ref 250–450)
UNSATURATED IRON BINDING CAPACITY: 193 UG/DL (ref 112–347)
VITAMIN B-12: 664 PG/ML (ref 232–1245)
WBC # BLD: 6.8 K/UL (ref 3.5–11)
WBC # BLD: ABNORMAL 10*3/UL

## 2020-06-09 PROCEDURE — 93306 TTE W/DOPPLER COMPLETE: CPT

## 2020-06-09 PROCEDURE — A9500 TC99M SESTAMIBI: HCPCS | Performed by: FAMILY MEDICINE

## 2020-06-09 PROCEDURE — 3430000000 HC RX DIAGNOSTIC RADIOPHARMACEUTICAL: Performed by: FAMILY MEDICINE

## 2020-06-09 PROCEDURE — 93017 CV STRESS TEST TRACING ONLY: CPT

## 2020-06-09 PROCEDURE — 85025 COMPLETE CBC W/AUTO DIFF WBC: CPT

## 2020-06-09 PROCEDURE — 82607 VITAMIN B-12: CPT

## 2020-06-09 PROCEDURE — 2580000003 HC RX 258: Performed by: FAMILY MEDICINE

## 2020-06-09 PROCEDURE — 83540 ASSAY OF IRON: CPT

## 2020-06-09 PROCEDURE — 80048 BASIC METABOLIC PNL TOTAL CA: CPT

## 2020-06-09 PROCEDURE — 83880 ASSAY OF NATRIURETIC PEPTIDE: CPT

## 2020-06-09 PROCEDURE — 36415 COLL VENOUS BLD VENIPUNCTURE: CPT

## 2020-06-09 PROCEDURE — 78452 HT MUSCLE IMAGE SPECT MULT: CPT

## 2020-06-09 PROCEDURE — 6360000002 HC RX W HCPCS: Performed by: FAMILY MEDICINE

## 2020-06-09 PROCEDURE — 83550 IRON BINDING TEST: CPT

## 2020-06-09 RX ORDER — AMINOPHYLLINE DIHYDRATE 25 MG/ML
50 INJECTION, SOLUTION INTRAVENOUS PRN
Status: DISCONTINUED | OUTPATIENT
Start: 2020-06-09 | End: 2020-06-09 | Stop reason: CLARIF

## 2020-06-09 RX ORDER — SODIUM CHLORIDE 0.9 % (FLUSH) 0.9 %
10 SYRINGE (ML) INJECTION PRN
Status: DISCONTINUED | OUTPATIENT
Start: 2020-06-09 | End: 2020-06-09 | Stop reason: HOSPADM

## 2020-06-09 RX ORDER — ATROPINE SULFATE 0.1 MG/ML
0.5 INJECTION INTRAVENOUS EVERY 5 MIN PRN
Status: ACTIVE | OUTPATIENT
Start: 2020-06-09 | End: 2020-06-09

## 2020-06-09 RX ORDER — METOPROLOL TARTRATE 5 MG/5ML
5 INJECTION INTRAVENOUS EVERY 5 MIN PRN
Status: ACTIVE | OUTPATIENT
Start: 2020-06-09 | End: 2020-06-09

## 2020-06-09 RX ORDER — ALBUTEROL SULFATE 90 UG/1
2 AEROSOL, METERED RESPIRATORY (INHALATION) PRN
Status: DISCONTINUED | OUTPATIENT
Start: 2020-06-09 | End: 2020-06-09 | Stop reason: HOSPADM

## 2020-06-09 RX ORDER — NITROGLYCERIN 0.4 MG/1
0.4 TABLET SUBLINGUAL EVERY 5 MIN PRN
Status: DISCONTINUED | OUTPATIENT
Start: 2020-06-09 | End: 2020-06-09 | Stop reason: HOSPADM

## 2020-06-09 RX ORDER — SODIUM CHLORIDE 0.9 % (FLUSH) 0.9 %
10 SYRINGE (ML) INJECTION PRN
Status: DISCONTINUED | OUTPATIENT
Start: 2020-06-09 | End: 2020-06-12 | Stop reason: HOSPADM

## 2020-06-09 RX ORDER — NITROGLYCERIN 0.4 MG/1
0.4 TABLET SUBLINGUAL EVERY 5 MIN PRN
Status: ACTIVE | OUTPATIENT
Start: 2020-06-09 | End: 2020-06-09

## 2020-06-09 RX ORDER — SODIUM CHLORIDE 9 MG/ML
500 INJECTION, SOLUTION INTRAVENOUS CONTINUOUS PRN
Status: DISCONTINUED | OUTPATIENT
Start: 2020-06-09 | End: 2020-06-09 | Stop reason: HOSPADM

## 2020-06-09 RX ORDER — ATROPINE SULFATE 0.1 MG/ML
0.5 INJECTION INTRAVENOUS EVERY 5 MIN PRN
Status: DISCONTINUED | OUTPATIENT
Start: 2020-06-09 | End: 2020-06-09 | Stop reason: HOSPADM

## 2020-06-09 RX ORDER — METOPROLOL TARTRATE 5 MG/5ML
5 INJECTION INTRAVENOUS EVERY 5 MIN PRN
Status: DISCONTINUED | OUTPATIENT
Start: 2020-06-09 | End: 2020-06-09 | Stop reason: HOSPADM

## 2020-06-09 RX ORDER — SODIUM CHLORIDE 9 MG/ML
500 INJECTION, SOLUTION INTRAVENOUS CONTINUOUS PRN
Status: ACTIVE | OUTPATIENT
Start: 2020-06-09 | End: 2020-06-09

## 2020-06-09 RX ORDER — SODIUM CHLORIDE 0.9 % (FLUSH) 0.9 %
10 SYRINGE (ML) INJECTION PRN
Status: ACTIVE | OUTPATIENT
Start: 2020-06-09 | End: 2020-06-09

## 2020-06-09 RX ADMIN — Medication 10 ML: at 10:15

## 2020-06-09 RX ADMIN — REGADENOSON 0.4 MG: 0.08 INJECTION, SOLUTION INTRAVENOUS at 10:15

## 2020-06-09 RX ADMIN — Medication 10 ML: at 09:11

## 2020-06-09 RX ADMIN — TETRAKIS(2-METHOXYISOBUTYLISOCYANIDE)COPPER(I) TETRAFLUOROBORATE 10.9 MILLICURIE: 1 INJECTION, POWDER, LYOPHILIZED, FOR SOLUTION INTRAVENOUS at 09:11

## 2020-06-09 RX ADMIN — TETRAKIS(2-METHOXYISOBUTYLISOCYANIDE)COPPER(I) TETRAFLUOROBORATE 35.3 MILLICURIE: 1 INJECTION, POWDER, LYOPHILIZED, FOR SOLUTION INTRAVENOUS at 10:16

## 2020-06-29 ENCOUNTER — HOSPITAL ENCOUNTER (OUTPATIENT)
Dept: OTHER | Age: 80
Discharge: HOME | End: 2020-06-29
Payer: MEDICARE

## 2020-06-29 VITALS
OXYGEN SATURATION: 98 % | RESPIRATION RATE: 18 BRPM | SYSTOLIC BLOOD PRESSURE: 116 MMHG | BODY MASS INDEX: 27.5 KG/M2 | DIASTOLIC BLOOD PRESSURE: 70 MMHG | WEIGHT: 214.2 LBS | HEART RATE: 64 BPM

## 2020-06-29 PROCEDURE — 99212 OFFICE O/P EST SF 10 MIN: CPT

## 2020-06-29 RX ORDER — PANTOPRAZOLE SODIUM 40 MG/1
40 GRANULE, DELAYED RELEASE ORAL 2 TIMES DAILY
COMMUNITY

## 2020-06-29 RX ORDER — LOSARTAN POTASSIUM 50 MG/1
50 TABLET ORAL DAILY
COMMUNITY

## 2020-06-29 RX ORDER — FUROSEMIDE 40 MG/1
40 TABLET ORAL DAILY
COMMUNITY

## 2020-06-29 RX ORDER — ATORVASTATIN CALCIUM 10 MG/1
10 TABLET, FILM COATED ORAL DAILY
COMMUNITY

## 2020-06-29 RX ORDER — GLIMEPIRIDE 2 MG/1
2 TABLET ORAL 2 TIMES DAILY
COMMUNITY

## 2020-06-29 RX ORDER — LANOLIN ALCOHOL/MO/W.PET/CERES
1000 CREAM (GRAM) TOPICAL DAILY
COMMUNITY

## 2020-06-29 RX ORDER — PANTOPRAZOLE SODIUM 40 MG/10ML
40 INJECTION, POWDER, LYOPHILIZED, FOR SOLUTION INTRAVENOUS DAILY
COMMUNITY
End: 2020-06-29 | Stop reason: CLARIF

## 2020-06-29 RX ORDER — TAMSULOSIN HYDROCHLORIDE 0.4 MG/1
0.4 CAPSULE ORAL DAILY
COMMUNITY

## 2020-06-29 RX ORDER — METOPROLOL SUCCINATE 50 MG/1
50 TABLET, EXTENDED RELEASE ORAL DAILY
COMMUNITY

## 2020-06-29 RX ORDER — POTASSIUM CITRATE 10 MEQ/1
10 TABLET, EXTENDED RELEASE ORAL 2 TIMES DAILY
COMMUNITY

## 2020-06-29 ASSESSMENT — EJECTION FRACTION
EF_SOURCE: 2D ECHO
EF_VALUE: 45-50

## 2020-07-13 ENCOUNTER — HOSPITAL ENCOUNTER (OUTPATIENT)
Dept: OTHER | Age: 80
Discharge: HOME | End: 2020-07-13
Payer: MEDICARE

## 2020-07-13 VITALS
RESPIRATION RATE: 20 BRPM | OXYGEN SATURATION: 97 % | BODY MASS INDEX: 26.86 KG/M2 | WEIGHT: 209.2 LBS | SYSTOLIC BLOOD PRESSURE: 116 MMHG | DIASTOLIC BLOOD PRESSURE: 68 MMHG | HEART RATE: 70 BPM

## 2020-07-13 LAB
ANION GAP SERPL CALCULATED.3IONS-SCNC: 10 MMOL/L (ref 9–17)
BNP INTERPRETATION: ABNORMAL
BUN BLDV-MCNC: 31 MG/DL (ref 8–23)
CHLORIDE BLD-SCNC: 103 MMOL/L (ref 98–107)
CO2: 24 MMOL/L (ref 20–31)
CREAT SERPL-MCNC: 1.65 MG/DL (ref 0.7–1.2)
GFR AFRICAN AMERICAN: 49 ML/MIN
GFR NON-AFRICAN AMERICAN: 40 ML/MIN
GFR SERPL CREATININE-BSD FRML MDRD: ABNORMAL ML/MIN/{1.73_M2}
GFR SERPL CREATININE-BSD FRML MDRD: ABNORMAL ML/MIN/{1.73_M2}
POTASSIUM SERPL-SCNC: 4.8 MMOL/L (ref 3.7–5.3)
PRO-BNP: 2295 PG/ML
SODIUM BLD-SCNC: 137 MMOL/L (ref 135–144)

## 2020-07-13 PROCEDURE — 36415 COLL VENOUS BLD VENIPUNCTURE: CPT

## 2020-07-13 PROCEDURE — 82565 ASSAY OF CREATININE: CPT

## 2020-07-13 PROCEDURE — 83880 ASSAY OF NATRIURETIC PEPTIDE: CPT

## 2020-07-13 PROCEDURE — 84520 ASSAY OF UREA NITROGEN: CPT

## 2020-07-13 PROCEDURE — 99211 OFF/OP EST MAY X REQ PHY/QHP: CPT

## 2020-07-13 PROCEDURE — 80051 ELECTROLYTE PANEL: CPT

## 2020-07-13 NOTE — PROGRESS NOTES
No questions. Wt daily - stable  Watching salt and wt. down. Encouraged to continue. ADL's without fatigue or dyspnea. Verbally reviewed importance of medication compliance with patient; patient verbalized understanding. Discussed 2000mg/day sodium restricted diet; patient verbalized understanding. Moderate daily exercise encouraged as tolerated. Discussed rest breaks as needed; patient verbalized understanding. Patient instructed to weigh self at the same time of each day, using same clothes and same scale; reinforced teaching to monitor for 3-5 lb weight increase over 1-2 days, and to notify the CHF clinic at [phone] or physician office if weight change noted. Patient verbalized understanding. Risks of smoking discussed with the patient if applicable; patient strongly discouraged to smoke. Patient verbalized understanding. Signs and symptoms of CHF discussed with patient, such as feeling more tired than normal, feeling short of breath, coughing that increases when you lie down, sudden weight gain, swelling of your feet, legs or belly. Patient verbalized understanding to notify the CHF clinic at [phone] or physician office if these symptoms occur. Compliance with plan of care and further disease process causes discussed with patient, patient encouraged to keep all follow up appointments. Patient verbalized understanding. Verbally reviewed importance of medication compliance with patient; patient verbalized understanding. Discussed 2000mg/day sodium restricted diet; patient verbalized understanding. Moderate daily exercise encouraged as tolerated. Discussed rest breaks as needed; patient verbalized understanding.     Patient instructed to weigh self at the same time of each day, using same clothes and same scale; reinforced teaching to monitor for 3-5 lb weight increase over 1-2 days, and to notify the CHF clinic at [phone] or physician office if weight change

## 2020-07-30 ENCOUNTER — HOSPITAL ENCOUNTER (OUTPATIENT)
Age: 80
Discharge: HOME | End: 2020-08-01
Payer: MEDICARE

## 2020-07-30 ENCOUNTER — HOSPITAL ENCOUNTER (OUTPATIENT)
Dept: GENERAL RADIOLOGY | Age: 80
Discharge: HOME | End: 2020-08-01
Payer: MEDICARE

## 2020-07-30 ENCOUNTER — HOSPITAL ENCOUNTER (OUTPATIENT)
Age: 80
Discharge: HOME | End: 2020-07-30
Payer: MEDICARE

## 2020-07-30 LAB
ABSOLUTE EOS #: 0.1 K/UL (ref 0–0.4)
ABSOLUTE IMMATURE GRANULOCYTE: ABNORMAL K/UL (ref 0–0.3)
ABSOLUTE LYMPH #: 0.9 K/UL (ref 1–4.8)
ABSOLUTE MONO #: 0.4 K/UL (ref 0.1–1.3)
ANION GAP SERPL CALCULATED.3IONS-SCNC: 11 MMOL/L (ref 9–17)
BASOPHILS # BLD: 1 % (ref 0–2)
BASOPHILS ABSOLUTE: 0 K/UL (ref 0–0.2)
BNP INTERPRETATION: ABNORMAL
BUN BLDV-MCNC: 24 MG/DL (ref 8–23)
BUN/CREAT BLD: ABNORMAL (ref 9–20)
CALCIUM SERPL-MCNC: 9.6 MG/DL (ref 8.6–10.4)
CHLORIDE BLD-SCNC: 105 MMOL/L (ref 98–107)
CO2: 23 MMOL/L (ref 20–31)
CREAT SERPL-MCNC: 1.46 MG/DL (ref 0.7–1.2)
DIFFERENTIAL TYPE: ABNORMAL
EOSINOPHILS RELATIVE PERCENT: 2 % (ref 0–4)
FERRITIN: 83 UG/L (ref 30–400)
FOLATE: 16.4 NG/ML
GFR AFRICAN AMERICAN: 56 ML/MIN
GFR NON-AFRICAN AMERICAN: 46 ML/MIN
GFR SERPL CREATININE-BSD FRML MDRD: ABNORMAL ML/MIN/{1.73_M2}
GFR SERPL CREATININE-BSD FRML MDRD: ABNORMAL ML/MIN/{1.73_M2}
GLUCOSE BLD-MCNC: 158 MG/DL (ref 70–99)
HCT VFR BLD CALC: 27.1 % (ref 41–53)
HEMOGLOBIN: 9.2 G/DL (ref 13.5–17.5)
IMMATURE GRANULOCYTES: ABNORMAL %
IRON SATURATION: 15 % (ref 20–55)
IRON: 44 UG/DL (ref 59–158)
LYMPHOCYTES # BLD: 15 % (ref 24–44)
MCH RBC QN AUTO: 32.4 PG (ref 26–34)
MCHC RBC AUTO-ENTMCNC: 34.1 G/DL (ref 31–37)
MCV RBC AUTO: 94.9 FL (ref 80–100)
MONOCYTES # BLD: 7 % (ref 1–7)
NRBC AUTOMATED: ABNORMAL PER 100 WBC
PDW BLD-RTO: 16 % (ref 11.5–14.9)
PLATELET # BLD: 181 K/UL (ref 150–450)
PLATELET ESTIMATE: ABNORMAL
PMV BLD AUTO: 7.8 FL (ref 6–12)
POTASSIUM SERPL-SCNC: 4.5 MMOL/L (ref 3.7–5.3)
PRO-BNP: 2085 PG/ML
RBC # BLD: 2.85 M/UL (ref 4.5–5.9)
RBC # BLD: ABNORMAL 10*6/UL
SEG NEUTROPHILS: 75 % (ref 36–66)
SEGMENTED NEUTROPHILS ABSOLUTE COUNT: 4.7 K/UL (ref 1.3–9.1)
SODIUM BLD-SCNC: 139 MMOL/L (ref 135–144)
TOTAL IRON BINDING CAPACITY: 295 UG/DL (ref 250–450)
UNSATURATED IRON BINDING CAPACITY: 251 UG/DL (ref 112–347)
VITAMIN B-12: 749 PG/ML (ref 232–1245)
WBC # BLD: 6.3 K/UL (ref 3.5–11)
WBC # BLD: ABNORMAL 10*3/UL

## 2020-07-30 PROCEDURE — 83550 IRON BINDING TEST: CPT

## 2020-07-30 PROCEDURE — 82607 VITAMIN B-12: CPT

## 2020-07-30 PROCEDURE — 83880 ASSAY OF NATRIURETIC PEPTIDE: CPT

## 2020-07-30 PROCEDURE — 36415 COLL VENOUS BLD VENIPUNCTURE: CPT

## 2020-07-30 PROCEDURE — 82728 ASSAY OF FERRITIN: CPT

## 2020-07-30 PROCEDURE — 83540 ASSAY OF IRON: CPT

## 2020-07-30 PROCEDURE — 85025 COMPLETE CBC W/AUTO DIFF WBC: CPT

## 2020-07-30 PROCEDURE — 82746 ASSAY OF FOLIC ACID SERUM: CPT

## 2020-07-30 PROCEDURE — 80048 BASIC METABOLIC PNL TOTAL CA: CPT

## 2020-07-30 PROCEDURE — 71046 X-RAY EXAM CHEST 2 VIEWS: CPT

## 2020-08-10 ENCOUNTER — HOSPITAL ENCOUNTER (OUTPATIENT)
Age: 80
Discharge: HOME | End: 2020-08-10
Payer: MEDICARE

## 2020-08-10 LAB
ABSOLUTE EOS #: 0.2 K/UL (ref 0–0.4)
ABSOLUTE IMMATURE GRANULOCYTE: ABNORMAL K/UL (ref 0–0.3)
ABSOLUTE LYMPH #: 1.3 K/UL (ref 1–4.8)
ABSOLUTE MONO #: 0.6 K/UL (ref 0.1–1.3)
ANION GAP SERPL CALCULATED.3IONS-SCNC: 8 MMOL/L (ref 9–17)
BASOPHILS # BLD: 1 % (ref 0–2)
BASOPHILS ABSOLUTE: 0 K/UL (ref 0–0.2)
BNP INTERPRETATION: ABNORMAL
BUN BLDV-MCNC: 35 MG/DL (ref 8–23)
BUN/CREAT BLD: ABNORMAL (ref 9–20)
CALCIUM SERPL-MCNC: 9.4 MG/DL (ref 8.6–10.4)
CHLORIDE BLD-SCNC: 103 MMOL/L (ref 98–107)
CO2: 27 MMOL/L (ref 20–31)
CREAT SERPL-MCNC: 1.82 MG/DL (ref 0.7–1.2)
DIFFERENTIAL TYPE: ABNORMAL
EOSINOPHILS RELATIVE PERCENT: 3 % (ref 0–4)
GFR AFRICAN AMERICAN: 43 ML/MIN
GFR NON-AFRICAN AMERICAN: 36 ML/MIN
GFR SERPL CREATININE-BSD FRML MDRD: ABNORMAL ML/MIN/{1.73_M2}
GFR SERPL CREATININE-BSD FRML MDRD: ABNORMAL ML/MIN/{1.73_M2}
GLUCOSE BLD-MCNC: 108 MG/DL (ref 70–99)
HCT VFR BLD CALC: 29.4 % (ref 41–53)
HEMOGLOBIN: 9.8 G/DL (ref 13.5–17.5)
IMMATURE GRANULOCYTES: ABNORMAL %
LYMPHOCYTES # BLD: 20 % (ref 24–44)
MCH RBC QN AUTO: 31.6 PG (ref 26–34)
MCHC RBC AUTO-ENTMCNC: 33.4 G/DL (ref 31–37)
MCV RBC AUTO: 94.7 FL (ref 80–100)
MONOCYTES # BLD: 9 % (ref 1–7)
NRBC AUTOMATED: ABNORMAL PER 100 WBC
PDW BLD-RTO: 15.5 % (ref 11.5–14.9)
PLATELET # BLD: 189 K/UL (ref 150–450)
PLATELET ESTIMATE: ABNORMAL
PMV BLD AUTO: 8.2 FL (ref 6–12)
POTASSIUM SERPL-SCNC: 4.6 MMOL/L (ref 3.7–5.3)
PRO-BNP: 1799 PG/ML
RBC # BLD: 3.11 M/UL (ref 4.5–5.9)
RBC # BLD: ABNORMAL 10*6/UL
SEG NEUTROPHILS: 67 % (ref 36–66)
SEGMENTED NEUTROPHILS ABSOLUTE COUNT: 4.5 K/UL (ref 1.3–9.1)
SODIUM BLD-SCNC: 138 MMOL/L (ref 135–144)
WBC # BLD: 6.7 K/UL (ref 3.5–11)
WBC # BLD: ABNORMAL 10*3/UL

## 2020-08-10 PROCEDURE — 80048 BASIC METABOLIC PNL TOTAL CA: CPT

## 2020-08-10 PROCEDURE — 85025 COMPLETE CBC W/AUTO DIFF WBC: CPT

## 2020-08-10 PROCEDURE — 83880 ASSAY OF NATRIURETIC PEPTIDE: CPT

## 2020-08-10 PROCEDURE — 36415 COLL VENOUS BLD VENIPUNCTURE: CPT

## 2020-08-19 ENCOUNTER — HOSPITAL ENCOUNTER (OUTPATIENT)
Dept: ULTRASOUND IMAGING | Age: 80
Discharge: HOME | End: 2020-08-21
Payer: MEDICARE

## 2020-08-19 ENCOUNTER — HOSPITAL ENCOUNTER (OUTPATIENT)
Dept: OTHER | Age: 80
Discharge: HOME | End: 2020-08-19
Payer: MEDICARE

## 2020-08-19 VITALS
RESPIRATION RATE: 18 BRPM | OXYGEN SATURATION: 100 % | SYSTOLIC BLOOD PRESSURE: 102 MMHG | DIASTOLIC BLOOD PRESSURE: 60 MMHG | WEIGHT: 208.2 LBS | BODY MASS INDEX: 26.73 KG/M2 | HEART RATE: 76 BPM

## 2020-08-19 PROCEDURE — 99211 OFF/OP EST MAY X REQ PHY/QHP: CPT

## 2020-08-19 PROCEDURE — 76775 US EXAM ABDO BACK WALL LIM: CPT

## 2020-08-19 NOTE — PROGRESS NOTES
Weight stable Weighs at home \"good\" \"trying to watch salt do well with fluids\" Occasional dyspnea on exertion. Discuss activity as tolerated and rest when having symptoms. Voiced understanding. Verbally reviewed importance of medication compliance with patient; patient verbalized understanding. Discussed 2000mg/day sodium restricted diet; patient verbalized understanding. Moderate daily exercise encouraged as tolerated. Discussed rest breaks as needed; patient verbalized understanding. Patient instructed to weigh self at the same time of each day, using same clothes and same scale; reinforced teaching to monitor for 3-5 lb weight increase over 1-2 days, and to notify the CHF clinic at [phone] or physician office if weight change noted. Patient verbalized understanding. Risks of smoking discussed with the patient if applicable; patient strongly discouraged to smoke. Patient verbalized understanding. Signs and symptoms of CHF discussed with patient, such as feeling more tired than normal, feeling short of breath, coughing that increases when you lie down, sudden weight gain, swelling of your feet, legs or belly. Patient verbalized understanding to notify the CHF clinic at [phone] or physician office if these symptoms occur. Compliance with plan of care and further disease process causes discussed with patient, patient encouraged to keep all follow up appointments. Patient verbalized understanding. Verbally reviewed importance of medication compliance with patient; patient verbalized understanding. Discussed 2000mg/day sodium restricted diet; patient verbalized understanding. Moderate daily exercise encouraged as tolerated. Discussed rest breaks as needed; patient verbalized understanding.     Patient instructed to weigh self at the same time of each day, using same clothes and same scale; reinforced teaching to monitor for 3-5 lb weight increase over 1-2 days, and to

## 2020-08-31 ENCOUNTER — HOSPITAL ENCOUNTER (OUTPATIENT)
Dept: GENERAL RADIOLOGY | Age: 80
Discharge: HOME | End: 2020-09-02
Payer: MEDICARE

## 2020-08-31 ENCOUNTER — HOSPITAL ENCOUNTER (OUTPATIENT)
Age: 80
Discharge: HOME | End: 2020-08-31
Payer: MEDICARE

## 2020-08-31 LAB
ANION GAP SERPL CALCULATED.3IONS-SCNC: 14 MMOL/L (ref 9–17)
BUN BLDV-MCNC: 44 MG/DL (ref 8–23)
BUN/CREAT BLD: ABNORMAL (ref 9–20)
CALCIUM SERPL-MCNC: 9.7 MG/DL (ref 8.6–10.4)
CHLORIDE BLD-SCNC: 97 MMOL/L (ref 98–107)
CO2: 23 MMOL/L (ref 20–31)
CREAT SERPL-MCNC: 1.78 MG/DL (ref 0.7–1.2)
GFR AFRICAN AMERICAN: 45 ML/MIN
GFR NON-AFRICAN AMERICAN: 37 ML/MIN
GFR SERPL CREATININE-BSD FRML MDRD: ABNORMAL ML/MIN/{1.73_M2}
GFR SERPL CREATININE-BSD FRML MDRD: ABNORMAL ML/MIN/{1.73_M2}
GLUCOSE BLD-MCNC: 196 MG/DL (ref 70–99)
MAGNESIUM: 1.7 MG/DL (ref 1.6–2.6)
POTASSIUM SERPL-SCNC: 3.9 MMOL/L (ref 3.7–5.3)
SODIUM BLD-SCNC: 134 MMOL/L (ref 135–144)

## 2020-08-31 PROCEDURE — 36415 COLL VENOUS BLD VENIPUNCTURE: CPT

## 2020-08-31 PROCEDURE — 83735 ASSAY OF MAGNESIUM: CPT

## 2020-08-31 PROCEDURE — 72190 X-RAY EXAM OF PELVIS: CPT

## 2020-08-31 PROCEDURE — 80048 BASIC METABOLIC PNL TOTAL CA: CPT

## 2020-08-31 PROCEDURE — 72100 X-RAY EXAM L-S SPINE 2/3 VWS: CPT

## 2020-09-08 ENCOUNTER — HOSPITAL ENCOUNTER (OUTPATIENT)
Dept: CT IMAGING | Facility: CLINIC | Age: 80
Discharge: HOME | End: 2020-09-10
Payer: MEDICARE

## 2020-09-08 PROCEDURE — 72131 CT LUMBAR SPINE W/O DYE: CPT

## 2020-09-11 VITALS — WEIGHT: 208 LBS | BODY MASS INDEX: 26.69 KG/M2 | HEIGHT: 74 IN

## 2020-09-11 RX ORDER — TRAMADOL HYDROCHLORIDE 50 MG/1
50 TABLET ORAL DAILY
COMMUNITY
End: 2020-09-24

## 2020-09-11 ASSESSMENT — PAIN DESCRIPTION - LOCATION: LOCATION: BACK

## 2020-09-11 ASSESSMENT — PAIN DESCRIPTION - PROGRESSION: CLINICAL_PROGRESSION: NOT CHANGED

## 2020-09-11 ASSESSMENT — PAIN DESCRIPTION - ONSET: ONSET: ON-GOING

## 2020-09-11 ASSESSMENT — PAIN - FUNCTIONAL ASSESSMENT: PAIN_FUNCTIONAL_ASSESSMENT: PREVENTS OR INTERFERES SOME ACTIVE ACTIVITIES AND ADLS

## 2020-09-11 ASSESSMENT — ENCOUNTER SYMPTOMS
ROS SKIN COMMENTS: HX OF SKIN CANCER
EYES NEGATIVE: 1
ALLERGIC/IMMUNOLOGIC NEGATIVE: 1
SHORTNESS OF BREATH: 1
CONSTIPATION: 1
BACK PAIN: 1

## 2020-09-11 ASSESSMENT — PAIN DESCRIPTION - DESCRIPTORS: DESCRIPTORS: TIGHTNESS;ACHING;SHARP

## 2020-09-11 ASSESSMENT — PAIN DESCRIPTION - PAIN TYPE: TYPE: CHRONIC PAIN

## 2020-09-11 ASSESSMENT — PAIN SCALES - GENERAL: PAINLEVEL_OUTOF10: 3

## 2020-09-11 ASSESSMENT — PAIN DESCRIPTION - FREQUENCY: FREQUENCY: INTERMITTENT

## 2020-09-11 ASSESSMENT — PAIN DESCRIPTION - ORIENTATION: ORIENTATION: MID;LOWER

## 2020-09-11 NOTE — PROGRESS NOTES
[facility] Pain Management  Patient Pain Assessment  Consultation - Dr. Thai Marroquin    Primary Care Physician: Jojo Saldana DO    Chief complaint:   Chief Complaint   Patient presents with    Pain   . Reji Padilla is a 78 y.o. male evaluated on 9/15/2020. Patient is referred by Madina Hanna DO  Modality of virtual service provided -via telephone   Consent:  Patient and/or health care decision maker is aware that that patient may receive a bill for this telephone service, depending on one's insurance coverage, and has provided verbal consent to proceed: Yes    Patient identification was verified at the start of the visit: Yes    Chief complaint: Reji Padilla is 78 y.o.,  male, with  back  Pain  . HISTORY OF PRESENT ILLNESS:  Reji Padilla is 78 y.o. male with    Referring Diagnosis   S32.010B (ICD-10-CM) - Compression fracture of L1 lumbar vertebra, open, initial encounter [facility])     Patient is an 58-year-old man who is referred to the pain clinic in consultation for back pain. Patient reports he was lifting his wife from the wheelchair and transferring her and he slipped and fell and the wife fell on the top of him. He developed severe back pain. This happened about 3 years ago patient reports his pain is somewhat better now and usually have occurs when he moves. The pain is mostly axial in nature without much radiation. He does complain of some weakness in his lower extremities especially when he walks for a distance. He denies any tingling numbness or weakness. Patient denies any neurological symptoms. Back Pain   This is a new problem. The current episode started 1 to 4 weeks ago. The problem occurs intermittently. The pain is present in the lumbar spine. The quality of the pain is described as aching. The pain does not radiate. The pain is at a severity of 5/10 (0-5). The pain is moderate. The symptoms are aggravated by position (Movement). Associated symptoms include weakness. Pertinent negatives include no abdominal pain, chest pain, dysuria, numbness or tingling. RX Monitoring 9/15/2020   Periodic Controlled Substance Monitoring No signs of potential drug abuse or diversion identified. ;Assessed functional status. Current Pain Assessment  Pain Assessment  Pain Assessment: 0-10  Pain Level: 3  Patient's Stated Pain Goal: (to decrease pain with increased activity)  Pain Type: Chronic pain  Pain Location: Back  Pain Orientation: Mid, Lower  Pain Radiating Towards: none  Pain Descriptors: Tightness, Aching, Sharp  Pain Frequency: Intermittent  Pain Onset: On-going  Clinical Progression: Not changed  Functional Pain Assessment: Prevents or interferes some active activities and ADLs  Non-Pharmaceutical Pain Intervention(s): Rest, Repositioned, Relaxation techniques, Heat applied                    ADVERSE MEDICATION EFFECTS:   Constipation: yes  Bowel Regimen: Yes  Diet: common adult  Appetite:  ok  Sedation: no  Urinary Retention: no    FOCUSED PAIN SCALE:  Highest : 10  Lowest :1  Average: Range-3  When and What  was your last procedure:    Injection to lower back by Dr. Honey Mccoy  Was your procedureeffective:  Yes, not long term    ACTIVITY/SOCIAL/EMOTIONAL:  Sleep Pattern: 6 hours per night. generally restful sleep  Energy Level: Normal  Currently attending Physical Therapy:  No, pt requesting appropriate exercises   Home Exercises: none  Mobility: no current mobility problem   Do you use assistive devices? No  Have you fallen in the last 30 days?:  Yes, fell transferring wife to wheelchair T12 and L1 injury over a week ago.   Currently seeing a Psychiatristor Psychologist:  No  Emotional Issues: normal   Mood: appropriate     ABERRANT BEHAVIORS SINCE LAST VISIT:  Have you ever been treated in another Pain Clinic no  Refills for prescriptions appropriate: not applicable  Lost rx/pills: not applicable  Taking more medication than prescribed: not applicable  Are you receiving PAIN medications from  other doctors: yes, Dr. Moya Im- Tramadol  Last Urine/Serum Drug Screen : will obtain   Was Serum/UDS as anticipated?  not applicable  Brought pill bottles in :not applicable   Was Pill count appropriate? :not applicable   Are currently pregnant? not applicable  Recent ER visits: No             Past Medical History      Diagnosis Date    Arthritis     CAD (coronary artery disease)     Cancer ([facility]     skin    CHF (congestive heart failure) (HCC)     Diabetes mellitus ([facility]     History of blood transfusion     Hyperlipidemia     Hypertension        Surgical History  Past Surgical History:   Procedure Laterality Date    ATRIAL CARDIAC PACEMAKER INSERTION      CARDIAC CATHETERIZATION      CARDIAC SURGERY      EYE SURGERY      HERNIA REPAIR      SKIN BIOPSY         Medications  Current Outpatient Medications   Medication Sig Dispense Refill    JrVlx-[id number]-FG-B Cmp-C-Biot (INTEGRA PLUS PO) Take 125 mg by mouth daily      ALLOPURINOL PO Take 100 mg by mouth daily      traMADol (ULTRAM) 50 MG tablet Take 50 mg by mouth daily.       apixaban (ELIQUIS) 5 MG TABS tablet Take by mouth 2 times daily      atorvastatin (LIPITOR) 10 MG tablet Take 10 mg by mouth daily      vitamin D (CHOLECALCIFEROL) 48605 UNIT CAPS Take 50,000 Units by mouth daily      furosemide (LASIX) 40 MG tablet Take 40 mg by mouth daily      glimepiride (AMARYL) 2 MG tablet Take 2 mg by mouth 2 times daily      losartan (COZAAR) 50 MG tablet Take 50 mg by mouth daily      metFORMIN (GLUCOPHAGE) 500 MG tablet Take 500 mg by mouth 2 times daily (with meals)      metoprolol succinate (TOPROL XL) 50 MG extended release tablet Take 50 mg by mouth daily      pantoprazole sodium (PROTONIX) 40 MG PACK packet Take 40 mg by mouth every morning (before breakfast)      tamsulosin (FLOMAX) 0.4 MG capsule Take 0.4 mg by mouth daily      vitamin B-12 (CYANOCOBALAMIN) 1000 MCG tablet Take Positive for shortness of breath. Negative for cough and choking. Cardiovascular: Negative for chest pain and palpitations. Positive Heart history   Gastrointestinal: Positive for constipation. Negative for abdominal pain, nausea and vomiting. Endocrine: Negative. Negative for polyphagia. Genitourinary: Negative. Negative for dysuria and hematuria. Musculoskeletal: Positive for arthralgias and back pain. Skin: Negative for rash. Hx of skin cancer   Allergic/Immunologic: Negative. Neurological: Positive for weakness. Negative for tingling and numbness. Hematological: Bruises/bleeds easily. Psychiatric/Behavioral: Negative. Negative for self-injury, sleep disturbance and suicidal ideas. The patient is not nervous/anxious. GENERAL PHYSICAL EXAM:  Vitals: Ht 6' 2\" (1.88 m)   Wt 208 lb (94.3 kg)   BMI 26.71 kg/m² , Body mass index is 26.71 kg/m². Physical Exam  Skin:         Neurological:      Mental Status: He is alert and oriented to person, place, and time. Psychiatric:         Mood and Affect: Mood normal.      Ortho Exam      Nurses Notes and Vital Signs reviewed. DATA  Labs:  8/31/2020  6:57 PM - Wally, pn Incoming Lab Results From [facility]     Component  Value  Ref Range & Units  Status  Collected  Lab    Glucose  196High    70 - 99 mg/dL  Final  08/31/2020  5:46 PM  [facility] [address] [facility]    BUN  44High    8 - 23 mg/dL  Final  08/31/2020  5:46 PM  - [address]  1. 78High    0.70 - 1.20 mg/dL  Final  08/31/2020  5:46 PM  [address] Lab    Bun/Cre Ratio  NOT REPORTED  9 - 20  Final  08/31/2020  5:46 PM  [address] Lab    Calcium  9.7  8.6 - 10.4 mg/dL  Final  08/31/2020  5:46 PM  [address] Lab    Sodium  134Low    135 - 144 mmol/L  Final  08/31/2020  5:46 PM  [address] Lab    Potassium  3.9  3.7 - 5.3 mmol/L  Final  08/31/2020  5:46 PM  [address]  Lab Chloride  97Low    98 - 107 mmol/L  Final  08/31/2020  5:46 PM  [address] Lab    CO2  23  20 - 31 mmol/L  Final  08/31/2020  5:46 PM  [facility] [address] Lab    Anion Gap  14  9 - 17 mmol/L  Final  08/31/2020  5:46 PM  [facility] [address] Lab    GFR Non-  37Low    >60 mL/min  Final  08/31/2020  5:46 PM  [facility] [address] Lab    GFR   45Low    >60 mL/min  Final  08/31/2020  5:46 PM  [facility] [address] Lab    GFR Comment         Final  08       8/10/2020  3:45 PM - Wally, [facility] Incoming Lab Results From [facility]     Component  Value  Ref Range & Units  Status  Collected  Lab    WBC  6.7  3.5 - 11.0 k/uL  Final  08/10/2020  3:18 PM  [address] Lab    RBC  3. 11Low    4.5 - 5.9 m/uL  Final  08/10/2020  3:18 PM  [address] Lab    Hemoglobin  9.8Low    13.5 - 17.5 g/dL  Final  08/10/2020  3:18 PM  [address] Lab    Hematocrit  29.4Low    41 - 53 %  Final  08/10/2020  3:18 PM  [facility] [address] Lab    MCV  94.7  80 - 100 fL  Final  08/10/2020  3:18 PM  [address] Lab    [facility]  31.6  26 - 34 pg  Final  08/10/2020  3:18 PM  [address] Lab    MCHC  33.4  31 - 37 g/dL  Final  08/10/2020  3:18 PM  [address] Lab    RDW  15.5High    11.5 - 14.9 %  Final  08/10/2020  3:18 PM  [address] Lab    Platelets  711  723 - 450 k/uL  Final  08/10/2020  3:18 PM  [address] Lab    MPV  8.2  6.0 - 12.0 fL  Final  08/10/2020  3:18 PM  [address] Lab    [facility] Automated  NOT REPORTED  per 100 WBC  Final  08/10/2020  3:18 PM  [address] Lab    Differential Type  NOT REPORTED   Final  08/10/2020  3:18 PM  [address] Lab    Seg Neutrophils  67High    36 - 66 %  Final  08/10/2020  3:18 PM  [address] [facility]    Lymphocytes  20Low    24 - 44 %  Final  08/10/2020  3:18 PM  [facility] 9High    1 - 7 %  Final  08/10/2020  3:18 PM  [address] Lab    Eosinophils %  3  0 - 4 %  Final  08/10/2020  3:18 PM  [facility] [address] Lab    Basophils  1  0 - 2 %  Final  08/10/2020  3:18 PM  [facility] [address] Lab    Immature Granulocytes  NOT REPORTED  0 %  Final  08/10/2020  3:18 PM  [address] Lab    Segs Absolute  4.50  1.3 - 9.1 k/uL  Final  08/10/2020  3:18 PM  [address] Lab    Absolute Lymph #  1.30  1.0 - 4.8 k/uL  Final  08/10/2020  3:18 PM  [address] Lab    Absolute Mono #  0.60  0.1 - 1.3 k/uL  Final  08/10/2020  3:18 PM  [address] Lab    Absolute Eos #  0.20  0.0 - 0.4 k/uL  Final  08/10/2020  3:18 PM  [address] Lab    Basophils Absolute  0.00  0.0 - 0.2 k/uL  Final  08/10/2020  3:18 PM  [address] Lab    Absolute Immature Granulocyte  NOT REPORTED  0.00 - 0.30 k/uL  Final  08/10/2020  3:18 PM  [address] Lab    WBC Morphology  NOT REPORTED   Final  08/10/2020  3:18 PM  [address] Lab    RBC Morphology  NOT REPORTED   Final            Imaging:  Radiology Images and Reports reviewed where indicated and necessary  EXAMINATION:    CT OF THE LUMBAR SPINE WITHOUT CONTRAST  9/8/2020         TECHNIQUE:    CT of the lumbar spine was performed without the administration of    intravenous contrast. Multiplanar reformatted images are provided for review.     Dose modulation, iterative reconstruction, and/or weight based adjustment of    the mA/kV was utilized to reduce the radiation dose to as low as reasonably    achievable.         COMPARISON:    08/31/2020 and 12/20/2019         HISTORY:    ORDERING SYSTEM PROVIDED HISTORY: Lumbar pain    TECHNOLOGIST PROVIDED HISTORY:    Reason for Exam: patient c/o lower back pain after falling a week ago    Additional signs and symptoms: patient c/o lower back pain after falling a    week ago         FINDINGS:    BONES/ALIGNMENT: There is a transitional vertebral body with 6 lumbar type    vertebral bodies.  Acute burst fractures involve the T12 and L1 vertebral    bodies with approximately 10% loss of height and no retropulsion into the    spinal canal.  The bones are demineralized.  There is grade 1 anterolisthesis    of L5 on L6.         DEGENERATIVE CHANGES: There is mild multilevel spondylosis and facet    arthropathy.         SOFT TISSUES/RETROPERITONEUM: No paraspinal mass is seen.         Within the right adrenal gland, there is an incompletely imaged indeterminate    5.3 x 6.2 cm mass, previously measuring 8.3 x 10 cm.  This favors a resolving    hematoma, limited by the lack of contrast.  Diffuse atherosclerosis involves    the abdominal aorta and major branch vessels.  A penile pump is in situ.              Impression    1. Acute burst fractures of T12 and L1.  Recommend MRI of the lumbar spine    for further evaluation. 2. Decreasing 6.2 cm indeterminate right adrenal mass.  Recommend surgical    consultation. There is no problem list on file for this patient. ASSESSMENT    Leva Mohs is a 78 y.o. male with     1. Age-related osteoporosis with current pathological fracture, initial encounter    2. Compression fracture of L1 vertebra, initial encounter ([facility]    3. Compression fracture of T12 vertebra, initial encounter ([facility] [facility]           PLAN  Patient's   [] x-ray    [] CT scan    [] MRI  Were/was  Reviewed. These findings are consistent with the patient's symptoms and physical examination.       [x] Patient's findings on the x-ray were explained to the patient     Other reports reviewed include    [] Bone scan   [] EMG and nerve conduction studies   [x] Referral reports-  I also discussed with him the following treatment options Including advantages and disadvantages of each:    [x] Physical therapy    [x] Interventional pain treatment    [] Medication management    [] Surgical options    Patient's OARRS were reviewed. It is acceptable and appears patient is not receiving prescriptions from multiple prescribers. Patient is  forthcoming regarding prescriptions for pain medication in the past  Controlled Substances Monitoring: Periodic Controlled Substance Monitoring: No signs of potential drug abuse or diversion identified. , Assessed functional status. (Ishmael Barraza MD)    Counselling/Preventive measures for pain  Control:    [x]  Spine strengthening exercises are discussed with patient in detail. Patient has a burst fracture of L1. His T12 also has fracture. T11 appears somewhat irregular  Hence I will obtain a CT scan of the thoracic spine to rule out any thoracic compression fractures and also to evaluate the T11 better. Patient needs cardiac clearance and he is going to see Dr. Qiana Mejia on Thursday. Depending on cardiology evaluation we will proceed with kyphoplasty under local or general anesthesia. Patient needs to stop Eliquis before we can do the procedure for 2 days. If her cardiology allows then we will try to obtain CT scan of the thoracic spine and try to do the kyphoplasty in the early part of next week. Orders Placed This Encounter   Procedures    CT THORACIC SPINE WO CONTRAST     Standing Status:   Future     Standing Expiration Date:   10/5/2021      Patient has compression fractures at the levels of T12 and L1 this was conformed with  CT/MRI//x-rays. These fractures are severely symptomatic and is not responding well to the conservative measures including activity modification, oral medications and other conservative therapies. Patient's activities of daily living are severely compromised due to pain and inability to ambulate. We discussed Morbidity  associated with vertebral fractures often includes continued pain, decreased physical function, deformity, and potentially social isolation, all of which negatively impact the individual's overall quality of  life.   Mortality is also associated with vertebral fractures, as (women) diagnosed with a compression fracture of the spine have a 15% higher mortality rate than those who do not experience fractures. The treatment options were discussed with the patient. These include activity modification like bed rest, physical therapy, using the brace, medications to control the pain, and surgical options including kyphoplasty and open surgery. The kyphoplasty procedure was explained in detail to the patient including the risks like bleeding, infection ,nerve damage, damage to the spinal cord, paralysis, fractures of the bones, extravasation of the cement, pneumothorax, cardiac arrest and stroke. After discussion with the patient and the family, they decided to proceed with kyphoplasty. Patient understands and wanted to undergo the procedure in the near feature. We will schedule for the procedure once medical clearance is obtained. Decision Making Process : Patient's health history and referral records thoroughly reviewed before focused physical examination and discussion with patient. Over 50% of today's visit is spent on examining the patient and counseling. Level of complexity of date to be reviewed is Moderate. The chart date reviewed include the following: Imaging Reports. Summary of Care. Time spent reviewing with patient the below reports:   Medication safety, Treatment options. Level of diagnosis and management options of this case is multiple: involving the following management options: Interventions as needed, medication management as appropriate, future visits, activity modification, heat/ice as needed, Urine drug screen as required. [x]The patient's questions were answered to the best of my abilities. .    This note was created using voice recognition software. There may be inaccuracies of transcription  that are inadvertently overlooked prior to the signature.   There is any questions about the transcription please contact me. Due to the COVID-19 pandemic and the appropriate interventions by [facility], our non-urgent pain management patients will not be seen in the office at this time for their protection and the protection of our staff. To offer continuity of care, their prescriptions will be escribed this month after a careful chart review and review of their OARRS report  Pursuant to the emergency declaration under the [address] and [place] [address] waiver authority and the [employer] Act, this Virtual Visit was conducted, with patient's consent, to reduce the patient's risk of exposure to COVID-19 and provide continuity of care for an established patient. Services were provided through a video synchronous discussion virtually to substitute for in-person appointment. \"  Documentation:  I communicated with the patient and/or health care decision maker about plan of care  Details of this discussion including any medical advice provided: Total Time: 45 to 50 minutes    I affirm this is a Patient Initiated Episode with an Established Patient who has not had a related appointment within my department in the past 7 days or scheduled within the next 24 hours. This note was created using voice recognition software. There may be inaccuracies of transcription  that are inadvertently overlooked prior to the signature. There is any questions about the transcription please contact me.     Electronically signed by Kel Gomes MD on 9/15/2020 at 9:43 AM

## 2020-09-15 ENCOUNTER — HOSPITAL ENCOUNTER (OUTPATIENT)
Dept: PAIN MANAGEMENT | Age: 80
Discharge: HOME | End: 2020-09-15
Payer: MEDICARE

## 2020-09-15 PROCEDURE — 99203 OFFICE O/P NEW LOW 30 MIN: CPT

## 2020-09-15 PROCEDURE — 99443 PR PHYS/QHP TELEPHONE EVALUATION 21-30 MIN: CPT | Performed by: PAIN MEDICINE

## 2020-09-15 ASSESSMENT — ENCOUNTER SYMPTOMS
CHOKING: 0
NAUSEA: 0
COUGH: 0
PHOTOPHOBIA: 0
VOMITING: 0
SINUS PAIN: 0
ABDOMINAL PAIN: 0
EYE REDNESS: 0

## 2020-09-21 ENCOUNTER — HOSPITAL ENCOUNTER (OUTPATIENT)
Dept: CT IMAGING | Facility: CLINIC | Age: 80
Discharge: HOME | End: 2020-09-23
Payer: MEDICARE

## 2020-09-21 PROCEDURE — 72128 CT CHEST SPINE W/O DYE: CPT

## 2020-09-21 NOTE — RESULT ENCOUNTER NOTE
IMPRESSION:  1. Acute fractures T11 through L1 without appreciable retropulsion of the  posterior aspect of the vertebra.     2. Osteopenia. Nonacute appearing anterior compressions and endplate loss  in height T6 through T10. No acute malalignment.     3.  Multilevel degenerative and degenerative disc changes with mild canal  stenosis T9 through T12 due to ligamentous thickening and facet changes.     4.  Large right adrenal mass. Atherosclerotic disease. Other findings as  above.     Recommendation: Additional evaluation of the patient's right adrenal mass is  recommended. The lesion is slightly smaller than on prior study at 10.4 cm  but still persists and is still significantly heterogeneous. Patient has adrenal mass-does he need any further work-up before kyphoplasty?   Electronically signed by Adilson Escobedo MD on 9/21/2020 at 7:46 PM

## 2020-09-24 ENCOUNTER — HOSPITAL ENCOUNTER (OUTPATIENT)
Dept: PREADMISSION TESTING | Age: 80
Discharge: HOME | End: 2020-09-28
Payer: MEDICARE

## 2020-09-24 ENCOUNTER — HOSPITAL ENCOUNTER (OUTPATIENT)
Dept: PREADMISSION TESTING | Age: 80
Discharge: HOME | End: 2020-09-24

## 2020-09-24 VITALS
HEIGHT: 74 IN | DIASTOLIC BLOOD PRESSURE: 57 MMHG | RESPIRATION RATE: 12 BRPM | WEIGHT: 206 LBS | OXYGEN SATURATION: 100 % | BODY MASS INDEX: 26.44 KG/M2 | TEMPERATURE: 98.4 F | SYSTOLIC BLOOD PRESSURE: 129 MMHG | HEART RATE: 59 BPM

## 2020-09-24 LAB
ABSOLUTE EOS #: 0.1 K/UL (ref 0–0.4)
ABSOLUTE IMMATURE GRANULOCYTE: ABNORMAL K/UL (ref 0–0.3)
ABSOLUTE LYMPH #: 1.2 K/UL (ref 1–4.8)
ABSOLUTE MONO #: 0.6 K/UL (ref 0.1–1.3)
ANION GAP SERPL CALCULATED.3IONS-SCNC: 12 MMOL/L (ref 9–17)
BASOPHILS # BLD: 1 % (ref 0–2)
BASOPHILS ABSOLUTE: 0 K/UL (ref 0–0.2)
BUN BLDV-MCNC: 40 MG/DL (ref 8–23)
BUN/CREAT BLD: ABNORMAL (ref 9–20)
CALCIUM SERPL-MCNC: 9.5 MG/DL (ref 8.6–10.4)
CHLORIDE BLD-SCNC: 101 MMOL/L (ref 98–107)
CO2: 25 MMOL/L (ref 20–31)
CREAT SERPL-MCNC: 1.48 MG/DL (ref 0.7–1.2)
DIFFERENTIAL TYPE: ABNORMAL
EOSINOPHILS RELATIVE PERCENT: 2 % (ref 0–4)
GFR AFRICAN AMERICAN: 55 ML/MIN
GFR NON-AFRICAN AMERICAN: 46 ML/MIN
GFR SERPL CREATININE-BSD FRML MDRD: ABNORMAL ML/MIN/{1.73_M2}
GFR SERPL CREATININE-BSD FRML MDRD: ABNORMAL ML/MIN/{1.73_M2}
GLUCOSE BLD-MCNC: 122 MG/DL (ref 70–99)
HCT VFR BLD CALC: 30.3 % (ref 41–53)
HEMOGLOBIN: 10.2 G/DL (ref 13.5–17.5)
IMMATURE GRANULOCYTES: ABNORMAL %
LYMPHOCYTES # BLD: 19 % (ref 24–44)
MCH RBC QN AUTO: 31.4 PG (ref 26–34)
MCHC RBC AUTO-ENTMCNC: 33.7 G/DL (ref 31–37)
MCV RBC AUTO: 93.3 FL (ref 80–100)
MONOCYTES # BLD: 9 % (ref 1–7)
NRBC AUTOMATED: ABNORMAL PER 100 WBC
PDW BLD-RTO: 17 % (ref 11.5–14.9)
PLATELET # BLD: 158 K/UL (ref 150–450)
PLATELET ESTIMATE: ABNORMAL
PMV BLD AUTO: 9 FL (ref 6–12)
POTASSIUM SERPL-SCNC: 4.2 MMOL/L (ref 3.7–5.3)
RBC # BLD: 3.25 M/UL (ref 4.5–5.9)
RBC # BLD: ABNORMAL 10*6/UL
SEG NEUTROPHILS: 69 % (ref 36–66)
SEGMENTED NEUTROPHILS ABSOLUTE COUNT: 4.7 K/UL (ref 1.3–9.1)
SODIUM BLD-SCNC: 138 MMOL/L (ref 135–144)
WBC # BLD: 6.7 K/UL (ref 3.5–11)
WBC # BLD: ABNORMAL 10*3/UL

## 2020-09-24 PROCEDURE — U0003 INFECTIOUS AGENT DETECTION BY NUCLEIC ACID (DNA OR RNA); SEVERE ACUTE RESPIRATORY SYNDROME CORONAVIRUS 2 (SARS-COV-2) (CORONAVIRUS DISEASE [COVID-19]), AMPLIFIED PROBE TECHNIQUE, MAKING USE OF HIGH THROUGHPUT TECHNOLOGIES AS DESCRIBED BY CMS-2020-01-R: HCPCS

## 2020-09-24 PROCEDURE — 36415 COLL VENOUS BLD VENIPUNCTURE: CPT

## 2020-09-24 PROCEDURE — 80048 BASIC METABOLIC PNL TOTAL CA: CPT

## 2020-09-24 PROCEDURE — 85025 COMPLETE CBC W/AUTO DIFF WBC: CPT

## 2020-09-24 NOTE — PROGRESS NOTES
Dr. James Self present and reviews results of Echocardiogram, pacemaker device check, EKG,& Cardiac stress test results and no further orders needed.

## 2020-09-25 ENCOUNTER — ANESTHESIA EVENT (OUTPATIENT)
Dept: OPERATING ROOM | Age: 80
End: 2020-09-25
Payer: MEDICARE

## 2020-09-25 RX ORDER — SODIUM CHLORIDE, SODIUM LACTATE, POTASSIUM CHLORIDE, CALCIUM CHLORIDE 600; 310; 30; 20 MG/100ML; MG/100ML; MG/100ML; MG/100ML
INJECTION, SOLUTION INTRAVENOUS CONTINUOUS
Status: CANCELLED | OUTPATIENT
Start: 2020-09-25

## 2020-09-25 RX ORDER — SODIUM CHLORIDE 0.9 % (FLUSH) 0.9 %
10 SYRINGE (ML) INJECTION EVERY 12 HOURS SCHEDULED
Status: CANCELLED | OUTPATIENT
Start: 2020-09-25

## 2020-09-25 RX ORDER — LIDOCAINE HYDROCHLORIDE 10 MG/ML
1 INJECTION, SOLUTION EPIDURAL; INFILTRATION; INTRACAUDAL; PERINEURAL
Status: CANCELLED | OUTPATIENT
Start: 2020-09-25 | End: 2020-09-25

## 2020-09-25 RX ORDER — SODIUM CHLORIDE 0.9 % (FLUSH) 0.9 %
10 SYRINGE (ML) INJECTION PRN
Status: CANCELLED | OUTPATIENT
Start: 2020-09-25

## 2020-09-26 LAB — SARS-COV-2, NAA: NOT DETECTED

## 2020-09-28 ENCOUNTER — APPOINTMENT (OUTPATIENT)
Dept: GENERAL RADIOLOGY | Age: 80
End: 2020-09-28
Attending: PAIN MEDICINE
Payer: MEDICARE

## 2020-09-28 ENCOUNTER — HOSPITAL ENCOUNTER (OUTPATIENT)
Age: 80
Setting detail: OUTPATIENT SURGERY
Discharge: HOME | End: 2020-09-28
Attending: PAIN MEDICINE | Admitting: PAIN MEDICINE
Payer: MEDICARE

## 2020-09-28 ENCOUNTER — ANESTHESIA (OUTPATIENT)
Dept: OPERATING ROOM | Age: 80
End: 2020-09-28
Payer: MEDICARE

## 2020-09-28 VITALS
DIASTOLIC BLOOD PRESSURE: 62 MMHG | WEIGHT: 206 LBS | HEIGHT: 74 IN | OXYGEN SATURATION: 98 % | SYSTOLIC BLOOD PRESSURE: 120 MMHG | BODY MASS INDEX: 26.44 KG/M2 | TEMPERATURE: 96.6 F | HEART RATE: 60 BPM | RESPIRATION RATE: 14 BRPM

## 2020-09-28 VITALS — DIASTOLIC BLOOD PRESSURE: 72 MMHG | SYSTOLIC BLOOD PRESSURE: 160 MMHG | OXYGEN SATURATION: 100 % | TEMPERATURE: 94.6 F

## 2020-09-28 LAB
GLUCOSE BLD-MCNC: 75 MG/DL (ref 75–110)
GLUCOSE BLD-MCNC: 98 MG/DL (ref 75–110)

## 2020-09-28 PROCEDURE — C1894 INTRO/SHEATH, NON-LASER: HCPCS | Performed by: PAIN MEDICINE

## 2020-09-28 PROCEDURE — 7100000011 HC PHASE II RECOVERY - ADDTL 15 MIN: Performed by: PAIN MEDICINE

## 2020-09-28 PROCEDURE — 6360000002 HC RX W HCPCS: Performed by: PAIN MEDICINE

## 2020-09-28 PROCEDURE — 3600000002 HC SURGERY LEVEL 2 BASE: Performed by: PAIN MEDICINE

## 2020-09-28 PROCEDURE — 7100000030 HC ASPR PHASE II RECOVERY - FIRST 15 MIN: Performed by: PAIN MEDICINE

## 2020-09-28 PROCEDURE — 2709999900 HC NON-CHARGEABLE SUPPLY: Performed by: PAIN MEDICINE

## 2020-09-28 PROCEDURE — 88311 DECALCIFY TISSUE: CPT

## 2020-09-28 PROCEDURE — 6360000002 HC RX W HCPCS: Performed by: NURSE ANESTHETIST, CERTIFIED REGISTERED

## 2020-09-28 PROCEDURE — 2500000003 HC RX 250 WO HCPCS: Performed by: NURSE ANESTHETIST, CERTIFIED REGISTERED

## 2020-09-28 PROCEDURE — 82947 ASSAY GLUCOSE BLOOD QUANT: CPT

## 2020-09-28 PROCEDURE — 22513 PERQ VERTEBRAL AUGMENTATION: CPT | Performed by: PAIN MEDICINE

## 2020-09-28 PROCEDURE — 76000 FLUOROSCOPY <1 HR PHYS/QHP: CPT

## 2020-09-28 PROCEDURE — 72070 X-RAY EXAM THORAC SPINE 2VWS: CPT

## 2020-09-28 PROCEDURE — 88307 TISSUE EXAM BY PATHOLOGIST: CPT

## 2020-09-28 PROCEDURE — 3700000000 HC ANESTHESIA ATTENDED CARE: Performed by: PAIN MEDICINE

## 2020-09-28 PROCEDURE — 7100000031 HC ASPR PHASE II RECOVERY - ADDTL 15 MIN: Performed by: PAIN MEDICINE

## 2020-09-28 PROCEDURE — 3700000001 HC ADD 15 MINUTES (ANESTHESIA): Performed by: PAIN MEDICINE

## 2020-09-28 PROCEDURE — 2580000003 HC RX 258: Performed by: PAIN MEDICINE

## 2020-09-28 PROCEDURE — 7100000010 HC PHASE II RECOVERY - FIRST 15 MIN: Performed by: PAIN MEDICINE

## 2020-09-28 PROCEDURE — 22515 PERQ VERTEBRAL AUGMENTATION: CPT | Performed by: PAIN MEDICINE

## 2020-09-28 PROCEDURE — 7100000001 HC PACU RECOVERY - ADDTL 15 MIN: Performed by: PAIN MEDICINE

## 2020-09-28 PROCEDURE — 2720000010 HC SURG SUPPLY STERILE: Performed by: PAIN MEDICINE

## 2020-09-28 PROCEDURE — C1713 ANCHOR/SCREW BN/BN,TIS/BN: HCPCS | Performed by: PAIN MEDICINE

## 2020-09-28 PROCEDURE — 7100000000 HC PACU RECOVERY - FIRST 15 MIN: Performed by: PAIN MEDICINE

## 2020-09-28 PROCEDURE — 3600000012 HC SURGERY LEVEL 2 ADDTL 15MIN: Performed by: PAIN MEDICINE

## 2020-09-28 PROCEDURE — 2500000003 HC RX 250 WO HCPCS: Performed by: PAIN MEDICINE

## 2020-09-28 DEVICE — CEMENT C01A KYPHX HV-R BONE CEMENT EN
Type: IMPLANTABLE DEVICE | Site: BACK | Status: FUNCTIONAL
Brand: KYPHON® HV-R® BONE CEMENT

## 2020-09-28 RX ORDER — DIPHENHYDRAMINE HYDROCHLORIDE 50 MG/ML
12.5 INJECTION INTRAMUSCULAR; INTRAVENOUS
Status: DISCONTINUED | OUTPATIENT
Start: 2020-09-28 | End: 2020-09-28 | Stop reason: HOSPADM

## 2020-09-28 RX ORDER — DEXAMETHASONE SODIUM PHOSPHATE 4 MG/ML
INJECTION, SOLUTION INTRA-ARTICULAR; INTRALESIONAL; INTRAMUSCULAR; INTRAVENOUS; SOFT TISSUE PRN
Status: DISCONTINUED | OUTPATIENT
Start: 2020-09-28 | End: 2020-09-28 | Stop reason: SDUPTHER

## 2020-09-28 RX ORDER — SODIUM CHLORIDE, SODIUM LACTATE, POTASSIUM CHLORIDE, CALCIUM CHLORIDE 600; 310; 30; 20 MG/100ML; MG/100ML; MG/100ML; MG/100ML
INJECTION, SOLUTION INTRAVENOUS CONTINUOUS
Status: DISCONTINUED | OUTPATIENT
Start: 2020-09-28 | End: 2020-09-28 | Stop reason: HOSPADM

## 2020-09-28 RX ORDER — SODIUM CHLORIDE 0.9 % (FLUSH) 0.9 %
10 SYRINGE (ML) INJECTION PRN
Status: DISCONTINUED | OUTPATIENT
Start: 2020-09-28 | End: 2020-09-28 | Stop reason: HOSPADM

## 2020-09-28 RX ORDER — LIDOCAINE HYDROCHLORIDE 10 MG/ML
INJECTION, SOLUTION EPIDURAL; INFILTRATION; INTRACAUDAL; PERINEURAL PRN
Status: DISCONTINUED | OUTPATIENT
Start: 2020-09-28 | End: 2020-09-28 | Stop reason: SDUPTHER

## 2020-09-28 RX ORDER — ROCURONIUM BROMIDE 10 MG/ML
INJECTION, SOLUTION INTRAVENOUS PRN
Status: DISCONTINUED | OUTPATIENT
Start: 2020-09-28 | End: 2020-09-28 | Stop reason: SDUPTHER

## 2020-09-28 RX ORDER — FENTANYL CITRATE 50 UG/ML
25 INJECTION, SOLUTION INTRAMUSCULAR; INTRAVENOUS EVERY 5 MIN PRN
Status: DISCONTINUED | OUTPATIENT
Start: 2020-09-28 | End: 2020-09-28 | Stop reason: HOSPADM

## 2020-09-28 RX ORDER — LIDOCAINE HYDROCHLORIDE 10 MG/ML
1 INJECTION, SOLUTION EPIDURAL; INFILTRATION; INTRACAUDAL; PERINEURAL
Status: DISCONTINUED | OUTPATIENT
Start: 2020-09-28 | End: 2020-09-28 | Stop reason: HOSPADM

## 2020-09-28 RX ORDER — HYDROCODONE BITARTRATE AND ACETAMINOPHEN 5; 325 MG/1; MG/1
2 TABLET ORAL PRN
Status: DISCONTINUED | OUTPATIENT
Start: 2020-09-28 | End: 2020-09-28 | Stop reason: HOSPADM

## 2020-09-28 RX ORDER — BUPIVACAINE HYDROCHLORIDE AND EPINEPHRINE 5; 5 MG/ML; UG/ML
INJECTION, SOLUTION EPIDURAL; INTRACAUDAL; PERINEURAL PRN
Status: DISCONTINUED | OUTPATIENT
Start: 2020-09-28 | End: 2020-09-28 | Stop reason: ALTCHOICE

## 2020-09-28 RX ORDER — ONDANSETRON 2 MG/ML
4 INJECTION INTRAMUSCULAR; INTRAVENOUS
Status: DISCONTINUED | OUTPATIENT
Start: 2020-09-28 | End: 2020-09-28 | Stop reason: HOSPADM

## 2020-09-28 RX ORDER — PROPOFOL 10 MG/ML
INJECTION, EMULSION INTRAVENOUS PRN
Status: DISCONTINUED | OUTPATIENT
Start: 2020-09-28 | End: 2020-09-28 | Stop reason: SDUPTHER

## 2020-09-28 RX ORDER — MORPHINE SULFATE 2 MG/ML
2 INJECTION, SOLUTION INTRAMUSCULAR; INTRAVENOUS EVERY 5 MIN PRN
Status: DISCONTINUED | OUTPATIENT
Start: 2020-09-28 | End: 2020-09-28 | Stop reason: HOSPADM

## 2020-09-28 RX ORDER — MEPERIDINE HYDROCHLORIDE 25 MG/ML
12.5 INJECTION INTRAMUSCULAR; INTRAVENOUS; SUBCUTANEOUS EVERY 5 MIN PRN
Status: DISCONTINUED | OUTPATIENT
Start: 2020-09-28 | End: 2020-09-28 | Stop reason: HOSPADM

## 2020-09-28 RX ORDER — ONDANSETRON 2 MG/ML
INJECTION INTRAMUSCULAR; INTRAVENOUS PRN
Status: DISCONTINUED | OUTPATIENT
Start: 2020-09-28 | End: 2020-09-28 | Stop reason: SDUPTHER

## 2020-09-28 RX ORDER — PHENYLEPHRINE HYDROCHLORIDE 10 MG/ML
INJECTION INTRAVENOUS PRN
Status: DISCONTINUED | OUTPATIENT
Start: 2020-09-28 | End: 2020-09-28 | Stop reason: SDUPTHER

## 2020-09-28 RX ORDER — GLYCOPYRROLATE 1 MG/5 ML
SYRINGE (ML) INTRAVENOUS PRN
Status: DISCONTINUED | OUTPATIENT
Start: 2020-09-28 | End: 2020-09-28 | Stop reason: SDUPTHER

## 2020-09-28 RX ORDER — SODIUM CHLORIDE 0.9 % (FLUSH) 0.9 %
10 SYRINGE (ML) INJECTION EVERY 12 HOURS SCHEDULED
Status: DISCONTINUED | OUTPATIENT
Start: 2020-09-28 | End: 2020-09-28 | Stop reason: HOSPADM

## 2020-09-28 RX ORDER — HYDRALAZINE HYDROCHLORIDE 20 MG/ML
5 INJECTION INTRAMUSCULAR; INTRAVENOUS EVERY 10 MIN PRN
Status: DISCONTINUED | OUTPATIENT
Start: 2020-09-28 | End: 2020-09-28 | Stop reason: HOSPADM

## 2020-09-28 RX ORDER — METOCLOPRAMIDE HYDROCHLORIDE 5 MG/ML
10 INJECTION INTRAMUSCULAR; INTRAVENOUS
Status: DISCONTINUED | OUTPATIENT
Start: 2020-09-28 | End: 2020-09-28 | Stop reason: HOSPADM

## 2020-09-28 RX ORDER — FENTANYL CITRATE 50 UG/ML
50 INJECTION, SOLUTION INTRAMUSCULAR; INTRAVENOUS EVERY 5 MIN PRN
Status: DISCONTINUED | OUTPATIENT
Start: 2020-09-28 | End: 2020-09-28 | Stop reason: HOSPADM

## 2020-09-28 RX ORDER — FENTANYL CITRATE 50 UG/ML
INJECTION, SOLUTION INTRAMUSCULAR; INTRAVENOUS PRN
Status: DISCONTINUED | OUTPATIENT
Start: 2020-09-28 | End: 2020-09-28 | Stop reason: SDUPTHER

## 2020-09-28 RX ORDER — NEOSTIGMINE METHYLSULFATE 5 MG/5 ML
SYRINGE (ML) INTRAVENOUS PRN
Status: DISCONTINUED | OUTPATIENT
Start: 2020-09-28 | End: 2020-09-28 | Stop reason: SDUPTHER

## 2020-09-28 RX ORDER — HYDROCODONE BITARTRATE AND ACETAMINOPHEN 5; 325 MG/1; MG/1
1 TABLET ORAL PRN
Status: DISCONTINUED | OUTPATIENT
Start: 2020-09-28 | End: 2020-09-28 | Stop reason: HOSPADM

## 2020-09-28 RX ORDER — LABETALOL HYDROCHLORIDE 5 MG/ML
5 INJECTION, SOLUTION INTRAVENOUS EVERY 10 MIN PRN
Status: DISCONTINUED | OUTPATIENT
Start: 2020-09-28 | End: 2020-09-28 | Stop reason: HOSPADM

## 2020-09-28 RX ADMIN — CEFAZOLIN 2 G: 10 INJECTION, POWDER, FOR SOLUTION INTRAVENOUS at 08:39

## 2020-09-28 RX ADMIN — PHENYLEPHRINE HYDROCHLORIDE 100 MCG: 10 INJECTION INTRAVENOUS at 08:22

## 2020-09-28 RX ADMIN — SODIUM CHLORIDE, POTASSIUM CHLORIDE, SODIUM LACTATE AND CALCIUM CHLORIDE: 600; 310; 30; 20 INJECTION, SOLUTION INTRAVENOUS at 08:10

## 2020-09-28 RX ADMIN — Medication 2 MG: at 09:47

## 2020-09-28 RX ADMIN — ONDANSETRON 4 MG: 2 INJECTION INTRAMUSCULAR; INTRAVENOUS at 09:34

## 2020-09-28 RX ADMIN — PROPOFOL 30 MG: 10 INJECTION, EMULSION INTRAVENOUS at 09:41

## 2020-09-28 RX ADMIN — LIDOCAINE HYDROCHLORIDE 50 MG: 10 INJECTION, SOLUTION EPIDURAL; INFILTRATION; INTRACAUDAL; PERINEURAL at 08:22

## 2020-09-28 RX ADMIN — FENTANYL CITRATE 50 MCG: 50 INJECTION, SOLUTION INTRAMUSCULAR; INTRAVENOUS at 08:22

## 2020-09-28 RX ADMIN — Medication 3 MG: at 09:42

## 2020-09-28 RX ADMIN — Medication 0.4 MG: at 09:42

## 2020-09-28 RX ADMIN — ROCURONIUM BROMIDE 50 MG: 10 INJECTION INTRAVENOUS at 08:22

## 2020-09-28 RX ADMIN — FENTANYL CITRATE 50 MCG: 50 INJECTION, SOLUTION INTRAMUSCULAR; INTRAVENOUS at 09:39

## 2020-09-28 RX ADMIN — PROPOFOL 100 MG: 10 INJECTION, EMULSION INTRAVENOUS at 08:22

## 2020-09-28 RX ADMIN — DEXAMETHASONE SODIUM PHOSPHATE 4 MG: 4 INJECTION, SOLUTION INTRA-ARTICULAR; INTRALESIONAL; INTRAMUSCULAR; INTRAVENOUS; SOFT TISSUE at 08:40

## 2020-09-28 ASSESSMENT — PULMONARY FUNCTION TESTS
PIF_VALUE: 16
PIF_VALUE: 15
PIF_VALUE: 16
PIF_VALUE: 17
PIF_VALUE: 16
PIF_VALUE: 16
PIF_VALUE: 17
PIF_VALUE: 12
PIF_VALUE: 2
PIF_VALUE: 16
PIF_VALUE: 17
PIF_VALUE: 16
PIF_VALUE: 16
PIF_VALUE: 15
PIF_VALUE: 16
PIF_VALUE: 19
PIF_VALUE: 16
PIF_VALUE: 17
PIF_VALUE: 16
PIF_VALUE: 15
PIF_VALUE: 0
PIF_VALUE: 17
PIF_VALUE: 16
PIF_VALUE: 17
PIF_VALUE: 2
PIF_VALUE: 15
PIF_VALUE: 15
PIF_VALUE: 1
PIF_VALUE: 16
PIF_VALUE: 17
PIF_VALUE: 16
PIF_VALUE: 17
PIF_VALUE: 11
PIF_VALUE: 17
PIF_VALUE: 16
PIF_VALUE: 16
PIF_VALUE: 17
PIF_VALUE: 1
PIF_VALUE: 9
PIF_VALUE: 17
PIF_VALUE: 16
PIF_VALUE: 1
PIF_VALUE: 17
PIF_VALUE: 17
PIF_VALUE: 2
PIF_VALUE: 2
PIF_VALUE: 17
PIF_VALUE: 2
PIF_VALUE: 17
PIF_VALUE: 16
PIF_VALUE: 17
PIF_VALUE: 16
PIF_VALUE: 16
PIF_VALUE: 17
PIF_VALUE: 15
PIF_VALUE: 13
PIF_VALUE: 17
PIF_VALUE: 16
PIF_VALUE: 13
PIF_VALUE: 16
PIF_VALUE: 29
PIF_VALUE: 16
PIF_VALUE: 17
PIF_VALUE: 1
PIF_VALUE: 17
PIF_VALUE: 15
PIF_VALUE: 17
PIF_VALUE: 12
PIF_VALUE: 12

## 2020-09-28 ASSESSMENT — PAIN - FUNCTIONAL ASSESSMENT: PAIN_FUNCTIONAL_ASSESSMENT: 0-10

## 2020-09-28 ASSESSMENT — ENCOUNTER SYMPTOMS: STRIDOR: 0

## 2020-09-28 ASSESSMENT — PAIN SCALES - GENERAL
PAINLEVEL_OUTOF10: 0

## 2020-09-28 NOTE — PROGRESS NOTES
I got a call from short-stay unit that patient is bleeding from 1 of the incisions and the bleeding has not stopped. I went to see the patient had the dressings were soaked hence the dressings were removed and a pressure was applied using a sterile gauze for about 20 minutes but patient still continues to bleed. Hence stitch was done with 0 silk. Another suture was also placed at the level of L1 on the right side as it was also oozing. The sutures were done after prepping the skin with Betadine and in a sterile way. It appeared the bleeding has stopped. Pressure dressings were applied over the incisions. Patient was observed for another hour and there was no more bleeding. Hence was discharged home in stable condition. I called the patient around 7:36 PM and patient's daughter looked at the dressing and reported no more bleeding. Patient otherwise doing well. He was told to call the pain clinic if it are me if there is any problem.   Electronically signed by Hemal Hoffmann MD on 9/28/2020 at 7:37 PM

## 2020-09-28 NOTE — PROGRESS NOTES
Pre-Procedure Note    Patient Name: Tao Dominique   YOB: 1940  Room/Bed: [address] [place] [address] [address] Pool/[facility]  Medical Record Number: [id number]  Date: 9/28/2020       Indication:    1. Age-related osteoporosis with current pathological fracture with delayed healing, subsequent encounter    2. Compression fracture of T11 vertebra with delayed healing    3. Compression fracture of T12 vertebra with delayed healing    4. Compression fracture of L1 vertebra with delayed healing, subsequent encounter        Consent: On file. Vital Signs:   Vitals:    09/28/20 0648   BP: (!) 145/74   Pulse: 60   Resp: 18   Temp: 97.5 °F (36.4 °C)   SpO2: 100%       Past Medical History:   has a past medical history of Arthritis, CAD (coronary artery disease), Cancer ([facility] [address] CHF (congestive heart failure) ([facility] [address] Diabetes mellitus ([facility] [address] History of blood transfusion, Hyperlipidemia, Hypertension, and Pacemaker. Past Surgical History:   has a past surgical history that includes eye surgery; hernia repair; Cardiac surgery; Atrial cardiac pacemaker insertion; Cardiac catheterization; skin biopsy; Cataract removal (Bilateral); and pacemaker placement. Sedation/ Anesthesia Plan:   peranesthesiologist    Medications Planned:   Per anesthesiaologist  Patient is an appropriate candidate for planned procedure: yes    Patient's History and Physical examination was reviewed and there is no change.   Electronically signed by Sheela Estes MD on 9/28/2020 at 7:50 AM

## 2020-09-28 NOTE — ANESTHESIA POSTPROCEDURE EVALUATION
POST- ANESTHESIA EVALUATION       Pt Name: Loyda Godwin  MRN: [id number]  YOB: 1940  Date of evaluation: 9/28/2020  Time:  11:05 AM      BP (!) 129/57   Pulse 64   Temp 97.3 °F (36.3 °C) (Infrared)   Resp 14   Ht 6' 2\" (1.88 m)   Wt 206 lb (93.4 kg)   SpO2 100%   BMI 26.45 kg/m²      Consciousness Level  Awake  Cardiopulmonary Status  Stable  Pain Adequately Treated YES  Nausea / Vomiting  NO  Adequate Hydration  YES  Anesthesia Related Complications NONE      Electronically signed by Petra Howard MD on 9/28/2020 at 11:05 AM       Department of Anesthesiology  Postprocedure Note    Patient: Loyda Godwin  MRN: [id number]  YOB: 1940  Date of evaluation: 9/28/2020  Time:  11:05 AM     Procedure Summary     Date:  09/28/20 Room / Location:  [address] [place] [address] Laurence Castañeda  / [facility]: [facility]    Anesthesia Start:  6122 Anesthesia Stop:  1000    Procedure:  KYPHOPLASTY L1, T11 & T12 (N/A Back) Diagnosis:  (COMPRESSION FRACTURE L1, T11 & 12)    Surgeon:  Rhoda Reagan MD Responsible Provider:  Petra Howard MD    Anesthesia Type:  general ASA Status:  3          Anesthesia Type: general    Julia Phase I: Julia Score: 7    Julia Phase II:      Last vitals: Reviewed and per EMR flowsheets.        Anesthesia Post Evaluation

## 2020-09-28 NOTE — ANESTHESIA PRE PROCEDURE
Department of Anesthesiology  Preprocedure Note       Name:  Elaine Holliday   Age:  78 y.o.  :  1940                                          MRN:  [id number]         Date:  2020      Surgeon: Kiana Porter):  Vinayak Rubio MD    Procedure: Procedure(s):  KYPHOPLASTY L1, T11 & T12    Medications prior to admission:   Prior to Admission medications    Medication Sig Start Date End Date Taking? Authorizing Provider   CdMrh-[id number]-B Cmp-C-Biot (INTEGRA PLUS PO) Take 125 mg by mouth daily    Historical Provider, MD   ALLOPURINOL PO Take 100 mg by mouth daily    Historical Provider, MD   apixaban (ELIQUIS) 5 MG TABS tablet Take by mouth 2 times daily    Historical Provider, MD   atorvastatin (LIPITOR) 10 MG tablet Take 10 mg by mouth daily    Historical Provider, MD   vitamin D (CHOLECALCIFEROL) 58679 UNIT CAPS Take 50,000 Units by mouth daily    Historical Provider, MD   furosemide (LASIX) 40 MG tablet Take 40 mg by mouth daily Takes 40 mg. In am and 20 mg.  In pm    Historical Provider, MD   glimepiride (AMARYL) 2 MG tablet Take 2 mg by mouth 2 times daily    Historical Provider, MD   losartan (COZAAR) 50 MG tablet Take 50 mg by mouth daily    Historical Provider, MD   metFORMIN (GLUCOPHAGE) 500 MG tablet Take 500 mg by mouth 2 times daily (with meals)    Historical Provider, MD   metoprolol succinate (TOPROL XL) 50 MG extended release tablet Take 50 mg by mouth daily    Historical Provider, MD   pantoprazole sodium (PROTONIX) 40 MG PACK packet Take 40 mg by mouth 2 times daily     Historical Provider, MD   tamsulosin (FLOMAX) 0.4 MG capsule Take 0.4 mg by mouth daily    Historical Provider, MD   vitamin B-12 (CYANOCOBALAMIN) 1000 MCG tablet Take 1,000 mcg by mouth daily    Historical Provider, MD   potassium citrate (UROCIT-K) 10 MEQ (1080 MG) extended release tablet Take 10 mEq by mouth 2 times daily     Historical Provider, MD       Current medications:    Current Facility-Administered Medications Medication Dose Route Frequency Provider Last Rate Last Dose    lactated ringers infusion   Intravenous Continuous Emigdio Morley MD        sodium chloride flush 0.9 % injection 10 mL  10 mL Intravenous PRN Sage Boyle MD        ceFAZolin (ANCEF) 2 g in dextrose 5 % 50 mL IVPB  2 g Intravenous On Call Sage Boyle MD        lidocaine PF 1 % injection 1 mL  1 mL Intradermal Once PRN Cathy De La Fuente MD        sodium chloride flush 0.9 % injection 10 mL  10 mL Intravenous 2 times per day Cathy De La Fuente MD        sodium chloride flush 0.9 % injection 10 mL  10 mL Intravenous PRN Cathy De La Fuente MD           Allergies:  No Known Allergies    Problem List:    Patient Active Problem List   Diagnosis Code    Age-related osteoporosis with current pathological fracture M80.00XA       Past Medical History:        Diagnosis Date    Arthritis     CAD (coronary artery disease)     Cancer ([facility] [address]     skin    CHF (congestive heart failure) ([facility] [address] [facility]     Diabetes mellitus ([facility] [address]     History of blood transfusion     Hyperlipidemia     Hypertension     Pacemaker        Past Surgical History:        Procedure Laterality Date    ATRIAL CARDIAC PACEMAKER INSERTION      CARDIAC CATHETERIZATION      CARDIAC SURGERY      CATARACT REMOVAL Bilateral     with iol's    EYE SURGERY      HERNIA REPAIR      SKIN BIOPSY         Social History:    Social History     Tobacco Use    Smoking status: Former Smoker    Smokeless tobacco: Never Used    Tobacco comment: 1980 quit   Substance Use Topics    Alcohol use: Not Currently     Comment: one beer a day                                Counseling given: Not Answered  Comment: 1980 quit      Vital Signs (Current): There were no vitals filed for this visit.                                            BP Readings from Last 3 Encounters:   09/24/20 (!) 129/57   08/19/20 102/60   07/13/20 116/68       NPO Status: BMI:   Wt Readings from Last 3 Encounters:   09/24/20 206 lb (93.4 kg)   09/11/20 208 lb (94.3 kg)   08/19/20 208 lb 3.2 oz (94.4 kg)     There is no height or weight on file to calculate BMI.    CBC:   Lab Results   Component Value Date    WBC 6.7 09/24/2020    RBC 3.25 09/24/2020    HGB 10.2 09/24/2020    HCT 30.3 09/24/2020    MCV 93.3 09/24/2020    RDW 17.0 09/24/2020     09/24/2020       CMP:   Lab Results   Component Value Date     09/24/2020    K 4.2 09/24/2020     09/24/2020    CO2 25 09/24/2020    BUN 40 09/24/2020    CREATININE 1.48 09/24/2020    GFRAA 55 09/24/2020    LABGLOM 46 09/24/2020    GLUCOSE 122 09/24/2020    PROT 7.7 03/04/2020    CALCIUM 9.5 09/24/2020    BILITOT 1.09 03/04/2020    ALKPHOS 101 03/04/2020    AST 12 03/04/2020    ALT 7 03/04/2020       POC Tests: No results for input(s): POCGLU, POCNA, POCK, POCCL, POCBUN, POCHEMO, POCHCT in the last 72 hours.     Coags:   Lab Results   Component Value Date    PROTIME 16.0 01/14/2020    INR 1.3 01/14/2020       HCG (If Applicable): No results found for: PREGTESTUR, PREGSERUM, HCG, HCGQUANT     ABGs: No results found for: PHART, PO2ART, UQH3PBR, JKI0GIL, BEART, L6OIIWDU     Type & Screen (If Applicable):  No results found for: LABABO, LABRH    Drug/Infectious Status (If Applicable):  No results found for: HIV, HEPCAB    COVID-19 Screening (If Applicable):   Lab Results   Component Value Date    COVID19 Not Detected 09/24/2020         Anesthesia Evaluation  Patient summary reviewed and Nursing notes reviewed  Airway: Mallampati: II  TM distance: >3 FB   Neck ROM: full  Mouth opening: > = 3 FB Dental: normal exam         Pulmonary: breath sounds clear to auscultation      (-) rhonchi, wheezes, rales and stridor                           Cardiovascular:    (+) hypertension: no interval change, pacemaker: pacemaker, CAD: no interval change, CHF: no interval change, peripheral edema,     (-) murmur, weak pulses,  friction rub, systolic click, carotid bruit and  JVD    ECG reviewed  Rhythm: regular  Rate: normal  Echocardiogram reviewed    Cleared by cardiology           ROS comment: Estimated LV EF 45-50 %. Normal wall motions. Moderate left ventricular hypertrophy. Neuro/Psych:               GI/Hepatic/Renal:             Endo/Other:    (+) Diabetes, : arthritis: OA and no interval change. , . Abdominal:           Vascular:                                        Anesthesia Plan      general     ASA 3       Induction: intravenous. MIPS: Postoperative opioids intended and Prophylactic antiemetics administered. Anesthetic plan and risks discussed with patient. Plan discussed with CRNA.                   Amy Pina MD   9/28/2020

## 2020-09-28 NOTE — OP NOTE
Operative Note      Patient: Viv Michele  YOB: 1940  MRN: [id number]    Pre-Procedure Note    Patient Name: Viv Michele   YOB: 1940  Room/Bed: [facility] Ria Valdez Record Number: [id number]  Date: 9/28/2020       Indication:    1. Age-related osteoporosis with current pathological fracture with delayed healing, subsequent encounter    2. Compression fracture of T11 vertebra with delayed healing    3. Compression fracture of T12 vertebra with delayed healing    4. Compression fracture of L1 vertebra with delayed healing, subsequent encounter        Consent: On file. Vital Signs:   Vitals:    09/28/20 0648   BP: (!) 145/74   Pulse: 60   Resp: 18   Temp: 97.5 °F (36.4 °C)   SpO2: 100%       Past Medical History:   has a past medical history of Arthritis, CAD (coronary artery disease), Cancer ([facility] [address] CHF (congestive heart failure) ([facility] [address] Diabetes mellitus ([facility] [address] History of blood transfusion, Hyperlipidemia, Hypertension, and Pacemaker. Past Surgical History:   has a past surgical history that includes eye surgery; hernia repair; Cardiac surgery; Atrial cardiac pacemaker insertion; Cardiac catheterization; skin biopsy; Cataract removal (Bilateral); and pacemaker placement. Sedation/ Anesthesia Plan:   peranesthesiologist    Medications Planned:   Per anesthesiaologist  Patient is an appropriate candidate for planned procedure: yes    Patient's History and Physical examination was reviewed and there is no change. Electronically signed by Marcela Brooks MD on 9/28/2020 at 9:55 AM            Preoperative Diagnosis:                                              1. Age-related osteoporosis with current pathological fracture with delayed healing, subsequent encounter    2. Compression fracture of T11 vertebra with delayed healing    3. Compression fracture of T12 vertebra with delayed healing    4.  Compression fracture of L1 vertebra with delayed healing, subsequent encounter Postoperative Diagnosis:   1. Age-related osteoporosis with current pathological fracture with delayed healing, subsequent encounter    2. Compression fracture of T11 vertebra with delayed healing    3. Compression fracture of T12 vertebra with delayed healing    4. Compression fracture of L1 vertebra with delayed healing, subsequent encounter          Procedure Performed:  Vertebral bone biopsy with vertebral augmentation ( baloon Kyphoplasty) at the levels of T-11 and T-12L-1 under fluoroscopic guidance    Indication for the Procedure: Patient developed back pain and xray/ MRI/  CT scans were consistant with insufficiency fractures of T-11 and T-12L-1  The patient's pain is not responding well to conservative treatment including bed rest, activity modification and medications. Patient is in pain and not able to do PT well. As conservative measures failed, we decided to proceed with vertebral augmentation for the pain relief. The procedure and risks were discussed with patient and an informed consent was obtained. Procedure:   Patient was given 2 grams of Ancef IV prior to the procedure for antibiotic prophylaxis. Patient was given general endotracheal anesthesia and [facility] all pressure points well padded. To facilitate the procedure biplanar fluoroscopy was used and by adjusting the angles of fluoroscopy both AP and lateral views of corresponding vertebral body was optimized. Skin over the back was prepped with antiseptic solution and the procedure was done under strict aspetic precautions. Skin and deep tissues up to the periosteum of pedicle was infilitrated with  -5 ml of 0.5% Marcaine with epinephrine. Then using #11 blade a small skin incission was made. The the working canula with trochar in place was inserted such that it lies over the lateral aspect of the pedicle.    Then it was tapped slowly through the pedicle under constant fluoroscopic surveillance making sure that the medial border of the pedicle was not violated at any time. Then a bone biopsy canula was inserted through the canula and a bone biopsy was obtained. Then the Kyphon bone tamp was inserted through the working canula. This was done bipedicularly in similar fashion. Good placements were confirmed with fluoroscopy. At the level of L1 it appeared to be a small leak superiorly along  the superior endplate on the right side    The bone tamps  were then serially inflated, to reexpand the vertebral bodies, and restore more normal anatomy. In the meantime polymethylmethacrylate was mixed as per the protocol and Kyphon bone fillers were filled with it. Then they were immersed in warm water to obtain adequate consistency. The bone tamps were withdrawn, and bone cement was placed in both balloon cavities, using fluoroscopic guidance. The following are the volumes of contrast used to inflate the bone tamps and the volume of bone cement injected:    T11 Level right--- 3 ml. Of omnipauque   ---3.5 Ml of Polymethyl methacrylate              left---3 ml. Of omnipauque   ----3.5 Ml of Polymethyl methacrylate         T12  Level right---3 ml. Of omnipauque   ----3.5 Ml of Polymethyl methacrylate               left---3 ml. Of omnipauque   ----3.5 Ml of Polymethyl methacrylate      L1   Level right---2 ml. Of omnipauque   ----2 Ml of Polymethyl methacrylate              left---2 ml. Of omnipauque   ----2 Ml of Polymethyl methacrylate           Once the cement had set and was seen to be in good position by fluoroscopy, the working canula and bone fillers were removed. Final hemostasis was secured by applying pressure. The incisions were closed with Steri-strips, followed by sterile dressings. The patient was then turned supine onto the bed and awakened from anesthesia. The patient was extubated in the operating room, and taken to the recovery room in stable condition.   The patient

## 2020-09-28 NOTE — H&P
HISTORY and [address]       NAME:  Tao Dominique  MRN: [id number]   YOB: 1940   Date: 9/28/2020   Age: 78 y.o. Gender: male       COMPLAINT AND PRESENT HISTORY:     Tao Dominique is 78 y.o.,  male,  For KYPHOPLASTY L1, T11 & T12   Pt has history of COMPRESSION FRACTURE L1, T11 & 12  Pt C/O of pain in the lumbar spine area, did not  radiates to the lower limbs. The symptoms started 3 weeks ago , he states he falls on his bottom. Pt denies any numbness or tingling in his legs. The pain level is 2/10,  Did not increase with walking or standing. Pt reports,  he has  limitation in the range of motion, he can not  bend over. Pt denies any other symptoms. Pt has history of CAD, CHF and HTN,  Pt reports that he  stopped taking Eliquis 3 days ago. Pt NPO since the past midnight, he took losartan, metoprolol and pantoprazole with sip of water this morning,   Pt denies fever/chills, chest pain , SOB  Pt denies any problems with anesthesia, no HX of MRSA infection     CT THORACIC SPINE WO CONTRAST  Impression   1.  Acute fractures T11 through L1 without appreciable retropulsion of the   posterior aspect of the vertebra.       2.  Osteopenia.  Nonacute appearing anterior compressions and endplate loss   in height T6 through T10.  No acute malalignment.       3.  Multilevel degenerative and degenerative disc changes with mild canal   stenosis T9 through T12 due to ligamentous thickening and facet changes.       4.  Large right adrenal mass.  Atherosclerotic disease.  Other findings as   above.       Recommendation: Additional evaluation of the patient's right adrenal mass is   recommended.  The lesion is slightly smaller than on prior study at 10.4 cm   but still persists and is still significantly heterogeneous.      XR LUMBAR SPINE (2-3 VIEWS  Impression   Diffuse osteopenia.  Mild superior endplate S94 and probable L3 compression   fractures, new since the CT 12/20/2019Awilda Stevens multilevel lumbar spondylosis   and facet arthropathy    PAST MEDICAL HISTORY     Past Medical History:   Diagnosis Date    Arthritis     CAD (coronary artery disease)     Cancer ([facility] [address]     skin    [facility]est[facility]     Diabetes mellitus ([facility] [address] [facility]     History of blood transfusion     Hyperlipidemia     Hypertension     Pacemaker        SURGICAL HISTORY       Past Surgical History:   Procedure Laterality Date    ATRIAL CARDIAC PACEMAKER INSERTION      CARDIAC CATHETERIZATION      CARDIAC SURGERY      CATARACT REMOVAL Bilateral     with iol's    EYE SURGERY      HERNIA REPAIR      SKIN BIOPSY         FAMILY HISTORY     No family history on file.     SOCIAL HISTORY       Social History     Socioeconomic History    Marital status:      Spouse name: Not on file    Number of children: Not on file    Years of education: Not on file    Highest education level: Not on file   Occupational History    Not on file   Social Needs    Financial resource strain: Not on file    Food insecurity     Worry: Not on file     Inability: Not on file    Transportation needs     Medical: Not on file     Non-medical: Not on file   Tobacco Use    Smoking status: Former Smoker    Smokeless tobacco: Never Used    Tobacco comment: 1980 quit   Substance and Sexual Activity    Alcohol use: Not Currently     Comment: one beer a day    Drug use: Never    Sexual activity: Not on file   Lifestyle    Physical activity     Days per week: Not on file     Minutes per session: Not on file    Stress: Not on file   Relationships    Social connections     Talks on phone: Not on file     Gets together: Not on file     Attends [employer]ism service: Not on file     Active member of club or organization: Not on file     Attends meetings of clubs or organizations: Not on file     Relationship status: Not on file    Intimate partner violence     Fear of current or ex partner: Not on file Emotionally abused: Not on file     Physically abused: Not on file     Forced sexual activity: Not on file   Other Topics Concern    Not on file   Social History Narrative    Not on file           REVIEW OF SYSTEMS      No Known Allergies    No current facility-administered medications on file prior to encounter. Current Outpatient Medications on File Prior to Encounter   Medication Sig Dispense Refill    FkGgh-WiLhdv-FK-B Cmp-C-Biot (INTEGRA PLUS PO) Take 125 mg by mouth daily      ALLOPURINOL PO Take 100 mg by mouth daily      apixaban (ELIQUIS) 5 MG TABS tablet Take by mouth 2 times daily      atorvastatin (LIPITOR) 10 MG tablet Take 10 mg by mouth daily      vitamin D (CHOLECALCIFEROL) 66863 UNIT CAPS Take 50,000 Units by mouth daily      furosemide (LASIX) 40 MG tablet Take 40 mg by mouth daily Takes 40 mg. In am and 20 mg. In pm      glimepiride (AMARYL) 2 MG tablet Take 2 mg by mouth 2 times daily      losartan (COZAAR) 50 MG tablet Take 50 mg by mouth daily      metFORMIN (GLUCOPHAGE) 500 MG tablet Take 500 mg by mouth 2 times daily (with meals)      metoprolol succinate (TOPROL XL) 50 MG extended release tablet Take 50 mg by mouth daily      pantoprazole sodium (PROTONIX) 40 MG PACK packet Take 40 mg by mouth 2 times daily       tamsulosin (FLOMAX) 0.4 MG capsule Take 0.4 mg by mouth daily      vitamin B-12 (CYANOCOBALAMIN) 1000 MCG tablet Take 1,000 mcg by mouth daily      potassium citrate (UROCIT-K) 10 MEQ (1080 MG) extended release tablet Take 10 mEq by mouth 2 times daily          Negative except for what is mentioned in the HPI. GENERAL PHYSICAL EXAM     Vitals: See Nursing Flow Sheet for Vital Signs    GENERAL APPEARANCE:   Karlos Duran is 78 y.o.,  male,  nourished, conscious, alert. Does not appear to be distress or pain at this time. SKIN:  Warm, dry, no cyanosis or jaundice.               HEAD:  Normocephalic, atraumatic, no swelling or tenderness. EYES:  Pupils equal, reactive to light. EARS:  No discharge, no marked hearing loss. NOSE:  No rhinorrhea, epistaxis or septal deformity. THROAT:  Not congested. No ulceration bleeding or discharge. NECK:  No stiffness, trachea central.  No palpable masses or L.N.                 CHEST:  Symmetrical and equal on expansion. HEART:  RRR S1 > S2. No audible murmurs or gallops. LUNGS:  Equal on expansion, normal breath sounds. No adventitious sounds. ABDOMEN:  Soft on palpation. No dysphagia, No localized tenderness. No guarding or rigidity. No palpable hepatosplenomegaly. LYMPHATICS:  No palpable cervical lymphadenopathy. LOCOMOTOR, BACK AND SPINE:  No tenderness or deformities. EXTREMITIES:  Symmetrical, no pretibial edema. Penelopes sign negative. No discoloration or ulcerations. Normal gait,  limitation in the range of motion. NEUROLOGIC:  The patient is conscious, alert, oriented,Cranial nerve II-XII intact, taste and smell were not examined. No apparent focal sensory or motor deficits.              PROVISIONAL DIAGNOSES / SURGERY:    COMPRESSION FRACTURE L1, T11 & 12  KYPHOPLASTY L1, T11 & T12     Patient Active Problem List    Diagnosis Date Noted    Age-related osteoporosis with current pathological fracture            RAVINDRA Rutherford - CNP on 9/28/2020 at 6:10 AM

## 2020-09-29 ENCOUNTER — TELEPHONE (OUTPATIENT)
Dept: PAIN MANAGEMENT | Age: 80
End: 2020-09-29

## 2020-09-29 LAB — SURGICAL PATHOLOGY REPORT: NORMAL

## 2020-10-01 ENCOUNTER — HOSPITAL ENCOUNTER (OUTPATIENT)
Dept: PAIN MANAGEMENT | Age: 80
Discharge: HOME | End: 2020-10-01
Payer: MEDICARE

## 2020-10-01 VITALS
BODY MASS INDEX: 25.67 KG/M2 | TEMPERATURE: 97.8 F | RESPIRATION RATE: 16 BRPM | HEIGHT: 74 IN | OXYGEN SATURATION: 97 % | DIASTOLIC BLOOD PRESSURE: 63 MMHG | WEIGHT: 200 LBS | SYSTOLIC BLOOD PRESSURE: 123 MMHG | HEART RATE: 60 BPM

## 2020-10-01 PROCEDURE — 99214 OFFICE O/P EST MOD 30 MIN: CPT | Performed by: PAIN MEDICINE

## 2020-10-01 PROCEDURE — 99211 OFF/OP EST MAY X REQ PHY/QHP: CPT

## 2020-10-01 ASSESSMENT — PAIN DESCRIPTION - ONSET: ONSET: ON-GOING

## 2020-10-01 ASSESSMENT — ENCOUNTER SYMPTOMS
ALLERGIC/IMMUNOLOGIC NEGATIVE: 1
BACK PAIN: 1
GASTROINTESTINAL NEGATIVE: 1
RESPIRATORY NEGATIVE: 1
EYES NEGATIVE: 1

## 2020-10-01 ASSESSMENT — PAIN DESCRIPTION - LOCATION: LOCATION: BACK

## 2020-10-01 ASSESSMENT — PAIN DESCRIPTION - ORIENTATION: ORIENTATION: LOWER

## 2020-10-01 ASSESSMENT — PAIN DESCRIPTION - FREQUENCY: FREQUENCY: INTERMITTENT

## 2020-10-01 ASSESSMENT — PAIN SCALES - GENERAL: PAINLEVEL_OUTOF10: 3

## 2020-10-01 ASSESSMENT — PAIN DESCRIPTION - PAIN TYPE: TYPE: CHRONIC PAIN

## 2020-10-01 ASSESSMENT — PAIN DESCRIPTION - PROGRESSION: CLINICAL_PROGRESSION: GRADUALLY IMPROVING

## 2020-10-01 ASSESSMENT — PAIN DESCRIPTION - DESCRIPTORS: DESCRIPTORS: ACHING

## 2020-10-01 NOTE — PROGRESS NOTES
[facility] Pain Management  Patient Pain Assessment  RECHECK - Dr. Isac Lr    Primary Care Physician: Galina Patel DO    Chief complaint:   Chief Complaint   Patient presents with    Lower Back Pain   . HISTORY OF PRESENT ILLNESS:  Hugo Kendrick is 78 y.o. male with    Referring Diagnosis   S32.010B (ICD-10-CM) - Compression fracture of L1 lumbar vertebra, open, initial encounter [facility])     Patient is in the pain clinic with a chief complaint of pain involving the low back especially on the right lower lumbar region. This patient had undergone kyphoplasty at the levels of T11-T12 and L1 on 9/28/2020. He reports good improvement in the pain in the lower thoracic upper lumbar lumbar region were he had pain in the past.  He is complaining of pain in the right mid lumbar region in the paraspinal area. As far as the vertebral fractures pain he reports good improvement in the pain. He is able to increase his activity levels. He had the sutures placed in the incisions on the right side after the procedure as he was having bleeding. He is in the pain clinic today for removal of sutures. Back Pain   This is a new problem. The current episode started 1 to 4 weeks ago. The problem occurs intermittently. The pain is present in the lumbar spine. The quality of the pain is described as aching. The pain does not radiate. The pain is at a severity of 1/10 (0-5). The pain is mild. The symptoms are aggravated by position (Movement). Associated symptoms include weakness. Pertinent negatives include no abdominal pain, chest pain, dysuria, numbness or tingling. RX Monitoring 9/15/2020   Periodic Controlled Substance Monitoring No signs of potential drug abuse or diversion identified. ;Assessed functional status.        Current Pain Assessment  Pain Assessment  Pain Assessment: 0-10  Pain Level: 3  Patient's Stated Pain Goal: 2(Decrease pain level to 2)  Pain Type: Chronic pain  Pain Location: Back  Pain Orientation: Lower  Pain Descriptors: Aching  Pain Frequency: Intermittent  Pain Onset: On-going  Clinical Progression: Gradually improving  Non-Pharmaceutical Pain Intervention(s): Heat applied                    ADVERSE MEDICATION EFFECTS:   Constipation: no  Bowel Regimen: No:   Diet: common adultcommon adult  Appetite:  ok  Sedation:  not applicable  Urinary Retention: not applicable    FOCUSED PAINSCALE:  Highest : 4  Lowest :1  Average: Range-2  When and What  was your last procedure:   Kyphoplasty   Was your procedure effective:  Yes 80%    ACTIVITY/SOCIAL/EMOTIONAL:  Sleep Pattern: 6 hours per night. nightime awakenings  Energy Level:  Tired/Fatigued  Currently attending Physical Therapy:  No  Home Exercises:  no regular exercise just walks around the house  Mobility: no current mobility problem   Do you use assistive devices?   Yes uses a cane as needed  Have you fallen in the last 30 days?:  Yes  Currently seeing a Psychiatrist or Psychologist:  No  Emotional Issues: normal   Mood: appropriate     ABERRANT BEHAVIORS SINCE LAST VISIT:  Have you ever been treated in another Pain Clinic no  Refills for prescriptions appropriate: not applicable  Lost rx/pills:not applicable  Taking more medication than prescribed:  not applicable  Are you receiving PAIN medications from  other doctors: not applicable  Last Urine/Serum Drug Screen :   Was Serum/UDS as anticipated?  not applicable  Brought pill bottles in :not applicable   Was Pill count appropriate? :not applicable   Are currently pregnant?not applicable  Recent ER visits: No             Past Medical History      Diagnosis Date    Arthritis     CAD (coronary artery disease)     Cancer ([facility]     skin    CHF [facility]congestive [facility] failure) [facility]     [facility]     History of blood transfusion     Hyperlipidemia     Hypertension     Pacemaker        Surgical History  Past Surgical History:   Procedure Laterality Date    ATRIAL CARDIAC PACEMAKER INSERTION      CARDIAC CATHETERIZATION      CARDIAC SURGERY      CATARACT REMOVAL Bilateral     with iol's    EYE SURGERY      HERNIA REPAIR      KYPHOSIS SURGERY N/A 9/28/2020    KYPHOPLASTY L1, T11 & T12 performed by Alban Mcmullen MD at [place] [id number]      SKIN BIOPSY         Medications  Current Outpatient Medications   Medication Sig Dispense Refill    CaLpn-EePsrg-JP-B Cmp-C-Biot (INTEGRA PLUS PO) Take 125 mg by mouth daily      ALLOPURINOL PO Take 100 mg by mouth daily      apixaban (ELIQUIS) 5 MG TABS tablet Take by mouth 2 times daily      atorvastatin (LIPITOR) 10 MG tablet Take 10 mg by mouth daily      vitamin D (CHOLECALCIFEROL) 73451 UNIT CAPS Take 50,000 Units by mouth daily      furosemide (LASIX) 40 MG tablet Take 40 mg by mouth daily Takes 40 mg. In am and 20 mg. In pm      glimepiride (AMARYL) 2 MG tablet Take 2 mg by mouth 2 times daily      losartan (COZAAR) 50 MG tablet Take 50 mg by mouth daily      metFORMIN (GLUCOPHAGE) 500 MG tablet Take 500 mg by mouth 2 times daily (with meals)      metoprolol succinate (TOPROL XL) 50 MG extended release tablet Take 50 mg by mouth daily      pantoprazole sodium (PROTONIX) 40 MG PACK packet Take 40 mg by mouth 2 times daily       tamsulosin (FLOMAX) 0.4 MG capsule Take 0.4 mg by mouth daily      vitamin B-12 (CYANOCOBALAMIN) 1000 MCG tablet Take 1,000 mcg by mouth daily      potassium citrate (UROCIT-K) 10 MEQ (1080 MG) extended release tablet Take 10 mEq by mouth 2 times daily        No current facility-administered medications for this encounter. Allergies  Patient has no known allergies. Family History  family history is not on file.     Social History  Social History     Socioeconomic History    Marital status:      Spouse name: None    Number of children: None    Years of education: None    Highest education level: None   Occupational History    None   Social Needs    Financial resource strain: None    Food insecurity     Worry: None     Inability: None    Transportation needs     Medical: None     Non-medical: None   Tobacco Use    Smoking status: Former Smoker    Smokeless tobacco: Never Used    Tobacco comment: 1980 quit   Substance and Sexual Activity    Alcohol use: Not Currently     Comment: one beer a day    Drug use: Never    Sexual activity: None   Lifestyle    Physical activity     Days per week: None     Minutes per session: None    Stress: None   Relationships    Social connections     Talks on phone: None     Gets together: None     Attends Orthodoxy service: None     Active member of club or organization: None     Attends meetings of clubs or organizations: None     Relationship status: None    Intimate partner violence     Fear of current or ex partner: None     Emotionally abused: None     Physically abused: None     Forced sexual activity: None   Other Topics Concern    None   Social History Narrative    None      reports no history of drug use. REVIEW OF SYSTEMS:  Review of Systems   Constitutional: Negative. Negative for activity change, diaphoresis and unexpected weight change. HENT: Negative. Negative for ear pain and hearing loss. Eyes: Negative. Negative for pain and visual disturbance. Respiratory: Negative. Negative for cough and shortness of breath. Cardiovascular: Negative. Negative for chest pain and palpitations. Gastrointestinal: Negative. Negative for abdominal pain, diarrhea, nausea and vomiting. Endocrine: Negative. Negative for cold intolerance and polyuria. Genitourinary: Negative. Negative for dysuria and hematuria. Musculoskeletal: Positive for arthralgias, back pain and gait problem. Skin: Positive for wound. Checking incision site   Allergic/Immunologic: Negative. Negative for immunocompromised state. Neurological: Positive for dizziness, weakness and light-headedness.  Negative for tingling and numbness. Hematological:        Patient takes Eliquis   Psychiatric/Behavioral: Positive for sleep disturbance. GENERAL PHYSICAL EXAM:  Vitals: /63   Pulse 60   Temp 97.8 °F (36.6 °C)   Resp 16   Ht 6' 2\" (1.88 m)   Wt 200 lb (90.7 kg)   SpO2 97%   BMI 25.68 kg/m² , Body mass index is 25.68 kg/m². Physical Exam  Constitutional:       Appearance: Normal appearance. HENT:      Head: Normocephalic. Nose: Nose normal.   Eyes:      Extraocular Movements: Extraocular movements intact. Pupils: Pupils are equal, round, and reactive to light. Neck:      Musculoskeletal: Neck supple. Cardiovascular:      Rate and Rhythm: Normal rate and regular rhythm. Pulses: Normal pulses. Pulmonary:      Effort: Pulmonary effort is normal.      Breath sounds: Normal breath sounds. Abdominal:      General: There is no distension. Palpations: Abdomen is soft. Musculoskeletal:      Right lower leg: No edema. Left lower leg: No edema. Skin:     General: Skin is warm. Findings: No rash. Neurological:      Mental Status: He is alert and oriented to person, place, and time. Cranial Nerves: Cranial nerves are intact. No cranial nerve deficit. Sensory: Sensation is intact. No sensory deficit. Motor: Motor function is intact. Deep Tendon Reflexes:      Reflex Scores:       Patellar reflexes are 1+ on the right side and 1+ on the left side. Achilles reflexes are 0 on the right side and 0 on the left side. Comments: overall deconditioned   Psychiatric:         Mood and Affect: Mood normal.         Behavior: Behavior normal.         Thought Content: Thought content normal.         Cognition and Memory: Cognition normal.      Right Ankle Exam     Muscle Strength   The patient has normal right ankle strength. Left Ankle Exam     Muscle Strength   The patient has normal left ankle strength.       Right Knee Exam     Muscle Strength   The patient has normal right knee strength. Left Knee Exam     Muscle Strength   The patient has normal left knee strength. Right Hip Exam     Muscle Strength   The patient has normal right hip strength. Left Hip Exam     Muscle Strength   The patient has normal left hip strength. Back Exam     Tenderness   The patient is experiencing tenderness in the lumbar and sacroiliac. Range of Motion   Back extension: Limited and painful. Back flexion: Limited and painful. Back lateral bend right: Limited and painful. Back lateral bend left: Limited and painful. Back rotation right: Limited and painful. Back rotation left: Limited and painful. Other   Sensation: normal  Gait: antalgic     Comments: There is slight increase in the thoracic kyphosis with loss of lumbar lordosis. The incisions from the kyphoplasty appears to be healing well there are no signs of infection or inflammation. There are no signs of bleeding. Patient has a couple of sutures placed in the incisions because of continued bleeding it appears it has stopped. Skin of these incisions were prepped with Betadine and the sutures were removed in toto. Sterile dressings were reapplied. There appears to be slight increase in the muscle tone in the right paraspinal muscles in the lumbar region. Palpation revealed tenderness over the paraspinal muscles in the lumbar region. Nurses Notes and Vital Signs reviewed. Patient Active Problem List   Diagnosis    Age-related osteoporosis with current pathological fracture        ASSESSMENT    Berta Saavedra is a 78 y.o. male with    1. Age-related osteoporosis with current pathological fracture, initial encounter    2. Compression fracture of L1 vertebra, initial encounter ([facility]    3. Compression fracture of T12 vertebra, initial encounter ([facility]    4. Myofascial pain           PLAN  Sutures removed without any problem. There is no bleeding.   Sterile dressings were applied over the skin incisions. Patient is advised to keep the dressings in place for next few days and remove them over the weekend and then also to call the pain clinic if he develops any fever chills or any other signs of infection. The pain in the mid lumbar region appears to be myofascial in nature. He has tenderness over the paraspinal muscles. He is advised to work on stretches and exercises and at present he is not keen on going to physical therapy. He is told to call the pain clinic if the pain gets worse or if not getting any relief from the conservative management like applying heat and stretches. Patient is doing well with good pain relief after the  last procedure. Patient is able to increase the activity levels. Patient does not complain much pain and is overall satisfied with the pain relief. As patient is doing well at this time no further interventions are needed. Counselling/Preventive measures for pain  Control:    [x]  Spine strengthening exercises are discussed with patient in detail. Patient is encouraged to exercise and counseled extensively on importance of exercise and strengthening the muscles to help the pain. Patient is counseled on importance of exercise and,core strengthening. Some  specific exercises to strengthen the abdominal muscles and low back muscles Were discussed. Also aquatic (water) physical therapy and benefits were explained to patient. including \" Water supports the body and minimizes the effect of gravity, making it easier for patients to start an exercise program.\"   The following important principles were discussed with patient:  1. Limit Bed Rest--Studies show that people with short-term low-back pain who rest feel more pain and have a harder time with daily tasks than those who stay active. 2. Keep Exercising-patient is advised to stay away from strenuous activities like gardening and avoid whatever motion caused the pain in the first place.   3. Maintain Good Posture-Exercises  to maintain good posture were shown to patient. 4. To do exercises learned in PT regularly. []Information (handout) on exercise was  given to patient. Decision Making Process : Patient's health history and referral records thoroughly reviewed before focused physical examination and discussion with patient. Over 50% of today's visit is spent on examining the patient and counseling. Level of complexity of date to be reviewed is Moderate. The chart date reviewed include the following: Imaging Reports. Summary of Care. Time spent reviewing with patient the below reports:   Medication safety, Treatment options. Level of diagnosis and management options of this case is multiple: involving the following management options: Interventions as needed, medication management as appropriate, future visits, activity modification, heat/ice as needed, Urine drug screen as required. [x]The patient's questions were answered to the best of my abilities. This note was created using voice recognition software. There may be inaccuracies of transcription  that are inadvertently overlooked prior to the signature. There is any questions about the transcription please contact me. At this time as the patient is doing well we will see the patient as needed.  Patient is advised to contact the pain clinic if the pain returns are if our services are needed again    Electronically signed by Courtenay Bamberger, MD on 10/3/2020 at 7:31 AM

## 2020-10-03 ASSESSMENT — ENCOUNTER SYMPTOMS
NAUSEA: 0
EYE PAIN: 0
VOMITING: 0
COUGH: 0
DIARRHEA: 0
ABDOMINAL PAIN: 0
SHORTNESS OF BREATH: 0

## 2020-10-11 ASSESSMENT — ENCOUNTER SYMPTOMS
BACK PAIN: 1
ABDOMINAL PAIN: 0

## 2020-10-11 NOTE — PROGRESS NOTES
John Coy is a 78 y.o. male evaluated on 10/12/2020. Modality of virtual service provided -via telephone   Consent:  Patient and/or health care decision maker is aware that that patient may receive a bill for this telephone service, depending on one's insurance coverage, and has provided verbal consent to proceed: Yes    Patient identification was verified at the start of the visit: Yes    Chief complaint: John Coy is 78 y.o.,  male, with chief complaint of back pain. Referring Diagnosis   S32.010B (ICD-10-CM) - Compression fracture of L1 lumbar vertebra, open, initial encounter [facility])     Patient is complaining of pain involving the low back area. He had undergone kyphoplasty at the levels of T11-T12 and L1. He reports he has about 90 to 95% improvement in the pain. He reports the surgical scars are healed well and there is no bleeding. He is able to increase his activity levels. He is also having several questions regarding the osteoporosis and further course of action. Back Pain   This is a new problem. The current episode started 1 to 4 weeks ago. The problem occurs intermittently. The pain is present in the lumbar spine. The quality of the pain is described as aching. The pain does not radiate. The pain is at a severity of 1/10 (0-5). The pain is mild. The symptoms are aggravated by position (Movement). Associated symptoms include weakness. Pertinent negatives include no abdominal pain, chest pain, dysuria, numbness or tingling. Risk factors include history of osteoporosis. Alleviating factors:ice and rest   Lifestyle changes experienced with pain: Intermittent  Mood changes,none  Patient currently tired. Physical therapy did not do physical therapy. Are you under psychological counseling at present: Yes  Goals for treatment include:  Decrease in pain  Enjoy daily and recreational activities, return to previous status.         ACTIVITY/SOCIAL/EMOTIONAL:  Sleep or ex partner: None     Emotionally abused: None     Physically abused: None     Forced sexual activity: None   Other Topics Concern    None   Social History Narrative    None       No Known Allergies    Current Outpatient Medications on File Prior to Encounter   Medication Sig Dispense Refill    XhQeb-YqAqsx-HE-B Cmp-C-Biot (INTEGRA PLUS PO) Take 125 mg by mouth daily      ALLOPURINOL PO Take 100 mg by mouth daily      apixaban (ELIQUIS) 5 MG TABS tablet Take by mouth 2 times daily      atorvastatin (LIPITOR) 10 MG tablet Take 10 mg by mouth daily      vitamin D (CHOLECALCIFEROL) 82240 UNIT CAPS Take 50,000 Units by mouth daily      furosemide (LASIX) 40 MG tablet Take 40 mg by mouth daily Takes 40 mg. In am and 20 mg. In pm      glimepiride (AMARYL) 2 MG tablet Take 2 mg by mouth 2 times daily      losartan (COZAAR) 50 MG tablet Take 50 mg by mouth daily      metFORMIN (GLUCOPHAGE) 500 MG tablet Take 500 mg by mouth 2 times daily (with meals)      metoprolol succinate (TOPROL XL) 50 MG extended release tablet Take 50 mg by mouth daily      pantoprazole sodium (PROTONIX) 40 MG PACK packet Take 40 mg by mouth 2 times daily       tamsulosin (FLOMAX) 0.4 MG capsule Take 0.4 mg by mouth daily      vitamin B-12 (CYANOCOBALAMIN) 1000 MCG tablet Take 1,000 mcg by mouth daily      potassium citrate (UROCIT-K) 10 MEQ (1080 MG) extended release tablet Take 10 mEq by mouth 2 times daily        No current facility-administered medications on file prior to encounter. Review of Systems   Constitutional: Negative for activity change, appetite change, chills and diaphoresis. HENT: Positive for hearing loss. Negative for congestion, ear pain and sinus pain. Eyes: Negative. Negative for photophobia, redness and visual disturbance. Respiratory: Positive for shortness of breath. Negative for cough and choking. Cardiovascular: Negative for chest pain and palpitations.         Stable coronary artery disease   Gastrointestinal: Positive for constipation. Negative for abdominal pain, nausea and vomiting. Endocrine: Negative. Negative for cold intolerance and polyphagia. Genitourinary: Negative. Negative for dysuria and hematuria. Musculoskeletal: Positive for arthralgias and back pain. Skin: Negative. Negative for rash. Hx of skin cancer   Allergic/Immunologic: Negative. Neurological: Positive for weakness. Negative for tingling and numbness. Hematological: Bruises/bleeds easily. Psychiatric/Behavioral: Negative. Negative for self-injury, sleep disturbance and suicidal ideas. The patient is not nervous/anxious. Physical Exam  Skin:         Neurological:      Mental Status: He is alert and oriented to person, place, and time. Psychiatric:         Mood and Affect: Mood normal.        DATA:  LAB.:  9/24/2020 10:28 AM - Vilma Lo Incoming Lab Results From [facility]     Component  Value  Ref Range & Units  Status  Collected  Lab    Glucose  122High    70 - 99 mg/dL  Final  09/24/2020  8:30 AM  [facility]    BUN  40High    8 - 23 mg/dL  Final  09/24/2020  8:30 AM  [place] [id number] [facility]  1. 48High    0.70 - 1.20 mg/dL  Final  09/24/2020  8:30 AM  [facility]    Bun/Cre Ratio  NOT REPORTED  9 - 20  Final  09/24/2020  8:30 AM  [facility]    Calcium  9.5  8.6 - 10.4 mg/dL  Final  09/24/2020  8:30 AM  [facility]    Sodium  138  135 - 144 mmol/L  Final  09/24/2020  8:30 AM  [facility]    Potassium  4.2  3.7 - 5.3 mmol/L  Final  09/24/2020  8:30 AM  [facility]    Chloride  101  98 - 107 mmol/L  Final  09/24/2020  8:30 AM  [facility]    CO2  25  20 - 31 mmol/L  Final  09/24/2020  8:30 AM  [facility]    Anion Gap  12  9 - 17 mmol/L  Final  09/24/2020  8:30 AM  [facility]    GFR Non-  46Low    >60 mL/min  Final  09/24/2020  8:30 AM  [facility] [address] [facility]    GFR   55Low    >60 mL/min           X-Ray reports:  CT OF THE LUMBAR SPINE WITHOUT CONTRAST  9/8/2020         TECHNIQUE:    CT of the lumbar spine was performed without the administration of    intravenous contrast. Multiplanar reformatted images are provided for review. Dose modulation, iterative reconstruction, and/or weight based adjustment of    the mA/kV was utilized to reduce the radiation dose to as low as reasonably    achievable.         COMPARISON:    08/31/2020 and 12/20/2019         HISTORY:    ORDERING SYSTEM PROVIDED HISTORY: Lumbar pain    TECHNOLOGIST PROVIDED HISTORY:    Reason for Exam: patient c/o lower back pain after falling a week ago    Additional signs and symptoms: patient c/o lower back pain after falling a    week ago         FINDINGS:    BONES/ALIGNMENT: There is a transitional vertebral body with 6 lumbar type    vertebral bodies.  Acute burst fractures involve the T12 and L1 vertebral    bodies with approximately 10% loss of height and no retropulsion into the    spinal canal.  The bones are demineralized.  There is grade 1 anterolisthesis    of L5 on L6.         DEGENERATIVE CHANGES: There is mild multilevel spondylosis and facet    arthropathy.         SOFT TISSUES/RETROPERITONEUM: No paraspinal mass is seen.         Within the right adrenal gland, there is an incompletely imaged indeterminate    5.3 x 6.2 cm mass, previously measuring 8.3 x 10 cm.  This favors a resolving    hematoma, limited by the lack of contrast.  Diffuse atherosclerosis involves    the abdominal aorta and major branch vessels.  A penile pump is in situ.              Impression    1. Acute burst fractures of T12 and L1.  Recommend MRI of the lumbar spine    for further evaluation. 2. Decreasing 6.2 cm indeterminate right adrenal mass.  Recommend surgical    consultation. Clinical  impression:  1.  Age-related osteoporosis with current pathological fracture, initial encounter    2. Compression fracture of L1 vertebra, initial encounter ([address]    3. Compression fracture of T12 vertebra, initial encounter ([facility]    4. Myofascial pain    5. Age-related osteoporosis with current pathological fracture, sequela        Plan of care: An extensive discussion with the patient and his son regarding osteoporosis and treatment. Patient is advised to follow-up with his primary care physician. Patient would like to do some physical therapy and hence we will refer him to physical therapy I also order vitamin D and DEXA scan for this patient and he reports he will follow-up with his primary care physician regarding further management of osteoporosis. PDMP Monitoring:    Last PDMP Sarah Preston as Reviewed [facility]):  Review User Review Instant Review Result   Valeda Opitz 9/15/2020  5:15 AM Reviewed PDMP [1]     Counselling/Preventive measures for pain  Control:    [x]  Spine strengthening exercises are discussed with patient in detail. Important aspects of osteoporosis and its prevention and treatment were discussed with patient including lifestyle changes  The following were discussed with patient :    1) Make sure you get enough calcium in your diet or through supplements (roughly            1,000-1,200 mg/day, but will depend on your age). 2) Get enough vitamin D (400-1,000 IU/day, depending on your age and your blood level of vitamin D measured by your doctor). 3) To stop smoking. 4) Avoid excess alcohol intake: no more than two or three drinks a day. 5)  To engage in weight-bearing exercise. Aim for at least 2½ hours a week (30 minutes       a day five times a week or 50 minutes a day three times a week), or as much as you       can. Exercises that can improve balance, such as Andrés Chi or yoga, may help prevent      Falls.   6) Gait training and fall prevention measures         Orders Placed This Encounter   Procedures    DEXA Preparedness and Response Supplemental Appropriations Act, this Virtual Visit was conducted, with patient's consent, to reduce the patient's risk of exposure to COVID-19 and provide continuity of care for an established patient. Services were provided through a video synchronous discussion virtually to substitute for in-person appointment. \"  Documentation:  I communicated with the patient and/or health care decision maker about plan of care  Details of this discussion including any medical advice provided: Total Time: minutes: 21-30 minutes    I affirm this is a Patient Initiated Episode with an Established Patient who has not had a related appointment within my department in the past 7 days or scheduled within the next 24 hours.     Electronically signed by Robb Moreira MD on 10/12/2020 at 8:02 PM

## 2020-10-12 ENCOUNTER — HOSPITAL ENCOUNTER (OUTPATIENT)
Dept: PAIN MANAGEMENT | Age: 80
Discharge: HOME | End: 2020-10-12
Payer: MEDICARE

## 2020-10-12 PROCEDURE — 82306 VITAMIN D 25 HYDROXY: CPT

## 2020-10-12 PROCEDURE — 99443 PR PHYS/QHP TELEPHONE EVALUATION 21-30 MIN: CPT | Performed by: PAIN MEDICINE

## 2020-10-12 PROCEDURE — 99213 OFFICE O/P EST LOW 20 MIN: CPT

## 2020-10-12 ASSESSMENT — ENCOUNTER SYMPTOMS
CONSTIPATION: 1
VOMITING: 0
COUGH: 0
SINUS PAIN: 0
ALLERGIC/IMMUNOLOGIC NEGATIVE: 1
NAUSEA: 0
EYES NEGATIVE: 1
PHOTOPHOBIA: 0
EYE REDNESS: 0
SHORTNESS OF BREATH: 1
CHOKING: 0
ROS SKIN COMMENTS: HX OF SKIN CANCER

## 2020-11-02 ENCOUNTER — HOSPITAL ENCOUNTER (OUTPATIENT)
Dept: WOMENS IMAGING | Age: 80
Discharge: HOME | End: 2020-11-04
Payer: MEDICARE

## 2020-11-02 PROCEDURE — 77080 DXA BONE DENSITY AXIAL: CPT

## 2020-11-05 NOTE — RESULT ENCOUNTER NOTE
DEXA scan result:  According to [facility] criteria, the patient's bone mineral density is classified  as osteopenic  Electronically signed by Nicole Rea MD on 11/5/2020 at 3:20 PM

## 2020-11-06 PROBLEM — N18.32 STAGE 3B CHRONIC KIDNEY DISEASE (HCC): Status: ACTIVE | Noted: 2020-11-06

## 2020-11-25 ENCOUNTER — HOSPITAL ENCOUNTER (OUTPATIENT)
Age: 80
Discharge: HOME | End: 2020-11-25
Payer: MEDICARE

## 2020-11-25 LAB
ABSOLUTE EOS #: 0.2 K/UL (ref 0–0.4)
ABSOLUTE IMMATURE GRANULOCYTE: ABNORMAL K/UL (ref 0–0.3)
ABSOLUTE LYMPH #: 0.9 K/UL (ref 1–4.8)
ABSOLUTE MONO #: 0.6 K/UL (ref 0.1–1.3)
ANION GAP SERPL CALCULATED.3IONS-SCNC: 11 MMOL/L (ref 9–17)
ANION GAP SERPL CALCULATED.3IONS-SCNC: 11 MMOL/L (ref 9–17)
BASOPHILS # BLD: 1 % (ref 0–2)
BASOPHILS ABSOLUTE: 0 K/UL (ref 0–0.2)
BUN BLDV-MCNC: 29 MG/DL (ref 8–23)
BUN BLDV-MCNC: 29 MG/DL (ref 8–23)
BUN/CREAT BLD: ABNORMAL (ref 9–20)
BUN/CREAT BLD: ABNORMAL (ref 9–20)
CALCIUM CREATININE RATIO: 0.17
CALCIUM SERPL-MCNC: 9.4 MG/DL (ref 8.6–10.4)
CALCIUM SERPL-MCNC: 9.4 MG/DL (ref 8.6–10.4)
CALCIUM URINE: 4.3 MG/DL
CHLORIDE BLD-SCNC: 102 MMOL/L (ref 98–107)
CHLORIDE BLD-SCNC: 102 MMOL/L (ref 98–107)
CO2: 27 MMOL/L (ref 20–31)
CO2: 27 MMOL/L (ref 20–31)
CREAT SERPL-MCNC: 1.25 MG/DL (ref 0.7–1.2)
CREAT SERPL-MCNC: 1.25 MG/DL (ref 0.7–1.2)
CREATININE URINE: 25.3 MG/DL (ref 39–259)
CREATININE URINE: 26.3 MG/DL (ref 39–259)
DIFFERENTIAL TYPE: ABNORMAL
EOSINOPHILS RELATIVE PERCENT: 3 % (ref 0–4)
FREE KAPPA/LAMBDA RATIO: 0.28 (ref 0.26–1.65)
GFR AFRICAN AMERICAN: >60 ML/MIN
GFR AFRICAN AMERICAN: >60 ML/MIN
GFR NON-AFRICAN AMERICAN: 55 ML/MIN
GFR NON-AFRICAN AMERICAN: 55 ML/MIN
GFR SERPL CREATININE-BSD FRML MDRD: ABNORMAL ML/MIN/{1.73_M2}
GLUCOSE BLD-MCNC: 149 MG/DL (ref 70–99)
GLUCOSE BLD-MCNC: 149 MG/DL (ref 70–99)
HCT VFR BLD CALC: 33.9 % (ref 41–53)
HCT VFR BLD CALC: 33.9 % (ref 41–53)
HEMOGLOBIN: 12 G/DL (ref 13.5–17.5)
HEMOGLOBIN: 12 G/DL (ref 13.5–17.5)
IMMATURE GRANULOCYTES: ABNORMAL %
KAPPA FREE LIGHT CHAINS QNT: 2.29 MG/DL (ref 0.37–1.94)
LAMBDA FREE LIGHT CHAINS QNT: 8.11 MG/DL (ref 0.57–2.63)
LYMPHOCYTES # BLD: 14 % (ref 24–44)
MAGNESIUM: 2 MG/DL (ref 1.6–2.6)
MAGNESIUM: 2 MG/DL (ref 1.6–2.6)
MCH RBC QN AUTO: 33 PG (ref 26–34)
MCH RBC QN AUTO: 33 PG (ref 26–34)
MCHC RBC AUTO-ENTMCNC: 35.4 G/DL (ref 31–37)
MCHC RBC AUTO-ENTMCNC: 35.4 G/DL (ref 31–37)
MCV RBC AUTO: 93.4 FL (ref 80–100)
MCV RBC AUTO: 93.4 FL (ref 80–100)
MONOCYTES # BLD: 10 % (ref 1–7)
NRBC AUTOMATED: ABNORMAL PER 100 WBC
NRBC AUTOMATED: ABNORMAL PER 100 WBC
PDW BLD-RTO: 16.6 % (ref 11.5–14.9)
PDW BLD-RTO: 16.6 % (ref 11.5–14.9)
PHOSPHORUS: 4.1 MG/DL (ref 2.5–4.5)
PLATELET # BLD: 172 K/UL (ref 150–450)
PLATELET # BLD: 172 K/UL (ref 150–450)
PLATELET ESTIMATE: ABNORMAL
PMV BLD AUTO: 7.7 FL (ref 6–12)
PMV BLD AUTO: 7.7 FL (ref 6–12)
POTASSIUM SERPL-SCNC: 4.8 MMOL/L (ref 3.7–5.3)
POTASSIUM SERPL-SCNC: 4.8 MMOL/L (ref 3.7–5.3)
PTH INTACT: 55 PG/ML (ref 15–65)
RBC # BLD: 3.63 M/UL (ref 4.5–5.9)
RBC # BLD: 3.63 M/UL (ref 4.5–5.9)
RBC # BLD: ABNORMAL 10*6/UL
SEG NEUTROPHILS: 72 % (ref 36–66)
SEGMENTED NEUTROPHILS ABSOLUTE COUNT: 5 K/UL (ref 1.3–9.1)
SODIUM BLD-SCNC: 140 MMOL/L (ref 135–144)
SODIUM BLD-SCNC: 140 MMOL/L (ref 135–144)
TOTAL PROTEIN, URINE: <4 MG/DL
URINE TOTAL PROTEIN CREATININE RATIO: ABNORMAL (ref 0–0.2)
VITAMIN D 25-HYDROXY: 87.5 NG/ML (ref 30–100)
WBC # BLD: 6.7 K/UL (ref 3.5–11)
WBC # BLD: 6.7 K/UL (ref 3.5–11)
WBC # BLD: ABNORMAL 10*3/UL

## 2020-11-25 PROCEDURE — 84100 ASSAY OF PHOSPHORUS: CPT

## 2020-11-25 PROCEDURE — 87086 URINE CULTURE/COLONY COUNT: CPT

## 2020-11-25 PROCEDURE — 85027 COMPLETE CBC AUTOMATED: CPT

## 2020-11-25 PROCEDURE — 84165 PROTEIN E-PHORESIS SERUM: CPT

## 2020-11-25 PROCEDURE — 84155 ASSAY OF PROTEIN SERUM: CPT

## 2020-11-25 PROCEDURE — 83970 ASSAY OF PARATHORMONE: CPT

## 2020-11-25 PROCEDURE — 85025 COMPLETE CBC W/AUTO DIFF WBC: CPT

## 2020-11-25 PROCEDURE — 82340 ASSAY OF CALCIUM IN URINE: CPT

## 2020-11-25 PROCEDURE — 80048 BASIC METABOLIC PNL TOTAL CA: CPT

## 2020-11-25 PROCEDURE — 83883 ASSAY NEPHELOMETRY NOT SPEC: CPT

## 2020-11-25 PROCEDURE — 86334 IMMUNOFIX E-PHORESIS SERUM: CPT

## 2020-11-25 PROCEDURE — 36415 COLL VENOUS BLD VENIPUNCTURE: CPT

## 2020-11-25 PROCEDURE — 82306 VITAMIN D 25 HYDROXY: CPT

## 2020-11-25 PROCEDURE — 84156 ASSAY OF PROTEIN URINE: CPT

## 2020-11-25 PROCEDURE — 82570 ASSAY OF URINE CREATININE: CPT

## 2020-11-25 PROCEDURE — 83735 ASSAY OF MAGNESIUM: CPT

## 2020-11-26 LAB
CULTURE: NORMAL
Lab: NORMAL
SPECIMEN DESCRIPTION: NORMAL

## 2020-11-30 LAB
ALBUMIN (CALCULATED): 4.6 G/DL (ref 3.2–5.2)
ALBUMIN PERCENT: 69 % (ref 45–65)
ALPHA 1 PERCENT: 3 % (ref 3–6)
ALPHA 2 PERCENT: 10 % (ref 6–13)
ALPHA-1-GLOBULIN: 0.2 G/DL (ref 0.1–0.4)
ALPHA-2-GLOBULIN: 0.6 G/DL (ref 0.5–0.9)
BETA GLOBULIN: 0.6 G/DL (ref 0.5–1.1)
BETA PERCENT: 9 % (ref 11–19)
GAMMA GLOBULIN %: 10 % (ref 9–20)
GAMMA GLOBULIN: 0.6 G/DL (ref 0.5–1.5)
PATHOLOGIST: ABNORMAL
PATHOLOGIST: NORMAL
PROTEIN ELECTROPHORESIS, SERUM: ABNORMAL
SERUM IFX INTERP: NORMAL
TOTAL PROT. SUM,%: 101 % (ref 98–102)
TOTAL PROT. SUM: 6.6 G/DL (ref 6.3–8.2)
TOTAL PROTEIN: 6.6 G/DL (ref 6.4–8.3)

## 2020-12-08 ENCOUNTER — HOSPITAL ENCOUNTER (OUTPATIENT)
Age: 80
Discharge: HOME | End: 2020-12-08
Payer: MEDICARE

## 2020-12-08 LAB
ANION GAP SERPL CALCULATED.3IONS-SCNC: 12 MMOL/L (ref 9–17)
BUN BLDV-MCNC: 36 MG/DL (ref 8–23)
BUN/CREAT BLD: ABNORMAL (ref 9–20)
CALCIUM SERPL-MCNC: 9.9 MG/DL (ref 8.6–10.4)
CHLORIDE BLD-SCNC: 100 MMOL/L (ref 98–107)
CO2: 26 MMOL/L (ref 20–31)
CREAT SERPL-MCNC: 1.55 MG/DL (ref 0.7–1.2)
GFR AFRICAN AMERICAN: 52 ML/MIN
GFR NON-AFRICAN AMERICAN: 43 ML/MIN
GFR SERPL CREATININE-BSD FRML MDRD: ABNORMAL ML/MIN/{1.73_M2}
GFR SERPL CREATININE-BSD FRML MDRD: ABNORMAL ML/MIN/{1.73_M2}
GLUCOSE BLD-MCNC: 264 MG/DL (ref 70–99)
MAGNESIUM: 1.9 MG/DL (ref 1.6–2.6)
POTASSIUM SERPL-SCNC: 4.8 MMOL/L (ref 3.7–5.3)
SODIUM BLD-SCNC: 138 MMOL/L (ref 135–144)

## 2020-12-08 PROCEDURE — 36415 COLL VENOUS BLD VENIPUNCTURE: CPT

## 2020-12-08 PROCEDURE — 83735 ASSAY OF MAGNESIUM: CPT

## 2020-12-08 PROCEDURE — 80048 BASIC METABOLIC PNL TOTAL CA: CPT

## 2021-05-14 ENCOUNTER — HOSPITAL ENCOUNTER (OUTPATIENT)
Age: 81
Discharge: HOME | End: 2021-05-14
Payer: MEDICARE

## 2021-05-14 LAB
ABSOLUTE EOS #: 0.2 K/UL (ref 0–0.4)
ABSOLUTE IMMATURE GRANULOCYTE: ABNORMAL K/UL (ref 0–0.3)
ABSOLUTE LYMPH #: 1.1 K/UL (ref 1–4.8)
ABSOLUTE MONO #: 0.7 K/UL (ref 0.1–1.3)
ANION GAP SERPL CALCULATED.3IONS-SCNC: 11 MMOL/L (ref 9–17)
BASOPHILS # BLD: 0 % (ref 0–2)
BASOPHILS ABSOLUTE: 0 K/UL (ref 0–0.2)
BILIRUBIN URINE: NEGATIVE
BUN BLDV-MCNC: 47 MG/DL (ref 8–23)
BUN/CREAT BLD: ABNORMAL (ref 9–20)
CALCIUM SERPL-MCNC: 9.5 MG/DL (ref 8.6–10.4)
CHLORIDE BLD-SCNC: 102 MMOL/L (ref 98–107)
CO2: 25 MMOL/L (ref 20–31)
COLOR: YELLOW
COMMENT UA: NORMAL
CREAT SERPL-MCNC: 1.9 MG/DL (ref 0.7–1.2)
CREATININE URINE: 47.4 MG/DL (ref 39–259)
DIFFERENTIAL TYPE: ABNORMAL
EOSINOPHILS RELATIVE PERCENT: 2 % (ref 0–4)
GFR AFRICAN AMERICAN: 41 ML/MIN
GFR NON-AFRICAN AMERICAN: 34 ML/MIN
GFR SERPL CREATININE-BSD FRML MDRD: ABNORMAL ML/MIN/{1.73_M2}
GFR SERPL CREATININE-BSD FRML MDRD: ABNORMAL ML/MIN/{1.73_M2}
GLUCOSE BLD-MCNC: 104 MG/DL (ref 70–99)
GLUCOSE URINE: NEGATIVE
HCT VFR BLD CALC: 33 % (ref 41–53)
HEMOGLOBIN: 11.4 G/DL (ref 13.5–17.5)
IMMATURE GRANULOCYTES: ABNORMAL %
KETONES, URINE: NEGATIVE
LEUKOCYTE ESTERASE, URINE: NEGATIVE
LYMPHOCYTES # BLD: 15 % (ref 24–44)
MAGNESIUM: 1.9 MG/DL (ref 1.6–2.6)
MCH RBC QN AUTO: 32.2 PG (ref 26–34)
MCHC RBC AUTO-ENTMCNC: 34.5 G/DL (ref 31–37)
MCV RBC AUTO: 93.3 FL (ref 80–100)
MONOCYTES # BLD: 9 % (ref 1–7)
NITRITE, URINE: NEGATIVE
NRBC AUTOMATED: ABNORMAL PER 100 WBC
PDW BLD-RTO: 15.4 % (ref 11.5–14.9)
PH UA: 5 (ref 5–8)
PHOSPHORUS: 3.6 MG/DL (ref 2.5–4.5)
PLATELET # BLD: 180 K/UL (ref 150–450)
PLATELET ESTIMATE: ABNORMAL
PMV BLD AUTO: 8.3 FL (ref 6–12)
POTASSIUM SERPL-SCNC: 5 MMOL/L (ref 3.7–5.3)
PROTEIN UA: NEGATIVE
PTH INTACT: 45.7 PG/ML (ref 15–65)
RBC # BLD: 3.53 M/UL (ref 4.5–5.9)
RBC # BLD: ABNORMAL 10*6/UL
SEG NEUTROPHILS: 74 % (ref 36–66)
SEGMENTED NEUTROPHILS ABSOLUTE COUNT: 5.5 K/UL (ref 1.3–9.1)
SODIUM BLD-SCNC: 138 MMOL/L (ref 135–144)
SPECIFIC GRAVITY UA: 1.01 (ref 1–1.03)
TOTAL PROTEIN, URINE: 6 MG/DL
TURBIDITY: CLEAR
URINE HGB: NEGATIVE
URINE TOTAL PROTEIN CREATININE RATIO: 0.13 (ref 0–0.2)
UROBILINOGEN, URINE: NORMAL
VITAMIN D 25-HYDROXY: 69.8 NG/ML (ref 30–100)
WBC # BLD: 7.5 K/UL (ref 3.5–11)
WBC # BLD: ABNORMAL 10*3/UL

## 2021-05-14 PROCEDURE — 86334 IMMUNOFIX E-PHORESIS SERUM: CPT

## 2021-05-14 PROCEDURE — 83735 ASSAY OF MAGNESIUM: CPT

## 2021-05-14 PROCEDURE — 82306 VITAMIN D 25 HYDROXY: CPT

## 2021-05-14 PROCEDURE — 81003 URINALYSIS AUTO W/O SCOPE: CPT

## 2021-05-14 PROCEDURE — 84155 ASSAY OF PROTEIN SERUM: CPT

## 2021-05-14 PROCEDURE — 36415 COLL VENOUS BLD VENIPUNCTURE: CPT

## 2021-05-14 PROCEDURE — 80048 BASIC METABOLIC PNL TOTAL CA: CPT

## 2021-05-14 PROCEDURE — 84156 ASSAY OF PROTEIN URINE: CPT

## 2021-05-14 PROCEDURE — 84165 PROTEIN E-PHORESIS SERUM: CPT

## 2021-05-14 PROCEDURE — 84100 ASSAY OF PHOSPHORUS: CPT

## 2021-05-14 PROCEDURE — 82570 ASSAY OF URINE CREATININE: CPT

## 2021-05-14 PROCEDURE — 85025 COMPLETE CBC W/AUTO DIFF WBC: CPT

## 2021-05-14 PROCEDURE — 83970 ASSAY OF PARATHORMONE: CPT

## 2021-05-19 LAB
ALBUMIN (CALCULATED): 4.5 G/DL (ref 3.2–5.2)
ALBUMIN PERCENT: 69 % (ref 45–65)
ALPHA 1 PERCENT: 3 % (ref 3–6)
ALPHA 2 PERCENT: 10 % (ref 6–13)
ALPHA-1-GLOBULIN: 0.2 G/DL (ref 0.1–0.4)
ALPHA-2-GLOBULIN: 0.6 G/DL (ref 0.5–0.9)
BETA GLOBULIN: 0.6 G/DL (ref 0.5–1.1)
BETA PERCENT: 9 % (ref 11–19)
GAMMA GLOBULIN %: 11 % (ref 9–20)
GAMMA GLOBULIN: 0.7 G/DL (ref 0.5–1.5)
PATHOLOGIST: ABNORMAL
PATHOLOGIST: NORMAL
PROTEIN ELECTROPHORESIS, SERUM: ABNORMAL
SERUM IFX INTERP: NORMAL
TOTAL PROT. SUM,%: 102 % (ref 98–102)
TOTAL PROT. SUM: 6.6 G/DL (ref 6.3–8.2)
TOTAL PROTEIN: 6.5 G/DL (ref 6.4–8.3)

## 2021-05-20 ENCOUNTER — HOSPITAL ENCOUNTER (OUTPATIENT)
Dept: ULTRASOUND IMAGING | Age: 81
Discharge: HOME | End: 2021-05-22
Payer: MEDICARE

## 2021-05-20 DIAGNOSIS — N18.31 STAGE 3A CHRONIC KIDNEY DISEASE (HCC): ICD-10-CM

## 2021-05-20 PROCEDURE — 76775 US EXAM ABDO BACK WALL LIM: CPT

## 2021-05-24 ENCOUNTER — TELEPHONE (OUTPATIENT)
Dept: ONCOLOGY | Age: 81
End: 2021-05-24

## 2021-05-24 NOTE — TELEPHONE ENCOUNTER
Received eternal referral from Steven Mason, CNP from [facility] for pt to see Dr. Pura Moon in [place] for Monoclonal IgA & IgG Lambda. LM for pt to call back and schedule consult. Paperwork is on red clipboard behind check-in.       Electronically signed by Effie Mota on 5/24/2021 at 9:43 AM

## 2021-06-28 ENCOUNTER — TELEPHONE (OUTPATIENT)
Dept: ONCOLOGY | Age: 81
End: 2021-06-28

## 2021-12-20 ENCOUNTER — HOSPITAL ENCOUNTER (OUTPATIENT)
Dept: GENERAL RADIOLOGY | Age: 81
Discharge: HOME | End: 2021-12-22
Payer: MEDICARE

## 2021-12-20 ENCOUNTER — HOSPITAL ENCOUNTER (OUTPATIENT)
Age: 81
Discharge: HOME | End: 2021-12-20
Payer: MEDICARE

## 2021-12-20 ENCOUNTER — HOSPITAL ENCOUNTER (OUTPATIENT)
Dept: NON INVASIVE DIAGNOSTICS | Age: 81
Discharge: HOME | End: 2021-12-20
Payer: MEDICARE

## 2021-12-20 ENCOUNTER — HOSPITAL ENCOUNTER (OUTPATIENT)
Age: 81
Discharge: HOME | End: 2021-12-22
Payer: MEDICARE

## 2021-12-20 DIAGNOSIS — Z00.00 ROUTINE GENERAL MEDICAL EXAMINATION AT A HEALTH CARE FACILITY: ICD-10-CM

## 2021-12-20 DIAGNOSIS — R06.09 OTHER FORM OF DYSPNEA: ICD-10-CM

## 2021-12-20 LAB
ABSOLUTE EOS #: 0.2 K/UL (ref 0–0.4)
ABSOLUTE IMMATURE GRANULOCYTE: ABNORMAL K/UL (ref 0–0.3)
ABSOLUTE LYMPH #: 1.4 K/UL (ref 1–4.8)
ABSOLUTE MONO #: 0.6 K/UL (ref 0.1–1.3)
ALBUMIN SERPL-MCNC: 4.5 G/DL (ref 3.5–5.2)
ALBUMIN/GLOBULIN RATIO: ABNORMAL (ref 1–2.5)
ALP BLD-CCNC: 85 U/L (ref 40–129)
ALT SERPL-CCNC: 9 U/L (ref 5–41)
ANION GAP SERPL CALCULATED.3IONS-SCNC: 10 MMOL/L (ref 9–17)
AST SERPL-CCNC: 13 U/L
BASOPHILS # BLD: 1 % (ref 0–2)
BASOPHILS ABSOLUTE: 0 K/UL (ref 0–0.2)
BILIRUB SERPL-MCNC: 1.27 MG/DL (ref 0.3–1.2)
BILIRUBIN DIRECT: 0.32 MG/DL
BILIRUBIN URINE: NEGATIVE
BILIRUBIN, INDIRECT: 0.95 MG/DL (ref 0–1)
BNP INTERPRETATION: ABNORMAL
BUN BLDV-MCNC: 32 MG/DL (ref 8–23)
BUN/CREAT BLD: ABNORMAL (ref 9–20)
CALCIUM SERPL-MCNC: 9.8 MG/DL (ref 8.6–10.4)
CHLORIDE BLD-SCNC: 105 MMOL/L (ref 98–107)
CHOLESTEROL, FASTING: 141 MG/DL
CHOLESTEROL/HDL RATIO: 2.9
CO2: 27 MMOL/L (ref 20–31)
COLOR: YELLOW
COMMENT UA: NORMAL
CREAT SERPL-MCNC: 1.56 MG/DL (ref 0.7–1.2)
CREATININE URINE: 124.4 MG/DL (ref 39–259)
DIFFERENTIAL TYPE: ABNORMAL
EOSINOPHILS RELATIVE PERCENT: 3 % (ref 0–4)
GFR AFRICAN AMERICAN: 52 ML/MIN
GFR NON-AFRICAN AMERICAN: 43 ML/MIN
GFR SERPL CREATININE-BSD FRML MDRD: ABNORMAL ML/MIN/{1.73_M2}
GFR SERPL CREATININE-BSD FRML MDRD: ABNORMAL ML/MIN/{1.73_M2}
GLOBULIN: ABNORMAL G/DL (ref 1.5–3.8)
GLUCOSE BLD-MCNC: 122 MG/DL (ref 70–99)
GLUCOSE URINE: NEGATIVE
HCT VFR BLD CALC: 35.3 % (ref 41–53)
HDLC SERPL-MCNC: 48 MG/DL
HEMOGLOBIN: 11.9 G/DL (ref 13.5–17.5)
IMMATURE GRANULOCYTES: ABNORMAL %
KETONES, URINE: NEGATIVE
LDL CHOLESTEROL: 76 MG/DL (ref 0–130)
LEUKOCYTE ESTERASE, URINE: NEGATIVE
LV EF: 48 %
LVEF MODALITY: NORMAL
LYMPHOCYTES # BLD: 17 % (ref 24–44)
MCH RBC QN AUTO: 31.3 PG (ref 26–34)
MCHC RBC AUTO-ENTMCNC: 33.7 G/DL (ref 31–37)
MCV RBC AUTO: 92.9 FL (ref 80–100)
MICROALBUMIN/CREAT 24H UR: <12 MG/L
MICROALBUMIN/CREAT UR-RTO: NORMAL MCG/MG CREAT
MONOCYTES # BLD: 7 % (ref 1–7)
NITRITE, URINE: NEGATIVE
NRBC AUTOMATED: ABNORMAL PER 100 WBC
PDW BLD-RTO: 15.8 % (ref 11.5–14.9)
PH UA: 5.5 (ref 5–8)
PLATELET # BLD: 188 K/UL (ref 150–450)
PLATELET ESTIMATE: ABNORMAL
PMV BLD AUTO: 7.7 FL (ref 6–12)
POTASSIUM SERPL-SCNC: 4.1 MMOL/L (ref 3.7–5.3)
PRO-BNP: 2030 PG/ML
PROSTATE SPECIFIC ANTIGEN: 0.44 UG/L
PROTEIN UA: NEGATIVE
RBC # BLD: 3.8 M/UL (ref 4.5–5.9)
RBC # BLD: ABNORMAL 10*6/UL
SEG NEUTROPHILS: 72 % (ref 36–66)
SEGMENTED NEUTROPHILS ABSOLUTE COUNT: 5.8 K/UL (ref 1.3–9.1)
SODIUM BLD-SCNC: 142 MMOL/L (ref 135–144)
SPECIFIC GRAVITY UA: 1.02 (ref 1–1.03)
THYROXINE, FREE: 1.34 NG/DL (ref 0.93–1.7)
TOTAL PROTEIN: 7.3 G/DL (ref 6.4–8.3)
TRIGLYCERIDE, FASTING: 84 MG/DL
TSH SERPL DL<=0.05 MIU/L-ACNC: 2.8 MIU/L (ref 0.3–5)
TURBIDITY: CLEAR
URINE HGB: NEGATIVE
UROBILINOGEN, URINE: NORMAL
VITAMIN D 25-HYDROXY: 66.6 NG/ML (ref 30–100)
VLDLC SERPL CALC-MCNC: NORMAL MG/DL (ref 1–30)
WBC # BLD: 7.9 K/UL (ref 3.5–11)
WBC # BLD: ABNORMAL 10*3/UL

## 2021-12-20 PROCEDURE — G0103 PSA SCREENING: HCPCS

## 2021-12-20 PROCEDURE — 85025 COMPLETE CBC W/AUTO DIFF WBC: CPT

## 2021-12-20 PROCEDURE — 71046 X-RAY EXAM CHEST 2 VIEWS: CPT

## 2021-12-20 PROCEDURE — 93306 TTE W/DOPPLER COMPLETE: CPT

## 2021-12-20 PROCEDURE — 83880 ASSAY OF NATRIURETIC PEPTIDE: CPT

## 2021-12-20 PROCEDURE — 84443 ASSAY THYROID STIM HORMONE: CPT

## 2021-12-20 PROCEDURE — 81003 URINALYSIS AUTO W/O SCOPE: CPT

## 2021-12-20 PROCEDURE — 80076 HEPATIC FUNCTION PANEL: CPT

## 2021-12-20 PROCEDURE — 36415 COLL VENOUS BLD VENIPUNCTURE: CPT

## 2021-12-20 PROCEDURE — 80048 BASIC METABOLIC PNL TOTAL CA: CPT

## 2021-12-20 PROCEDURE — 80061 LIPID PANEL: CPT

## 2021-12-20 PROCEDURE — 82043 UR ALBUMIN QUANTITATIVE: CPT

## 2021-12-20 PROCEDURE — 82306 VITAMIN D 25 HYDROXY: CPT

## 2021-12-20 PROCEDURE — 84439 ASSAY OF FREE THYROXINE: CPT

## 2021-12-20 PROCEDURE — 82570 ASSAY OF URINE CREATININE: CPT

## 2022-02-01 ENCOUNTER — HOSPITAL ENCOUNTER (OUTPATIENT)
Age: 82
Discharge: HOME | End: 2022-02-01
Payer: MEDICARE

## 2022-02-01 LAB
ABSOLUTE EOS #: 0.1 K/UL (ref 0–0.4)
ABSOLUTE IMMATURE GRANULOCYTE: ABNORMAL K/UL (ref 0–0.3)
ABSOLUTE LYMPH #: 1.6 K/UL (ref 1–4.8)
ABSOLUTE MONO #: 0.5 K/UL (ref 0.1–1.3)
ANION GAP SERPL CALCULATED.3IONS-SCNC: 12 MMOL/L (ref 9–17)
BASOPHILS # BLD: 1 % (ref 0–2)
BASOPHILS ABSOLUTE: 0.1 K/UL (ref 0–0.2)
BNP INTERPRETATION: ABNORMAL
BUN BLDV-MCNC: 23 MG/DL (ref 8–23)
BUN/CREAT BLD: ABNORMAL (ref 9–20)
CALCIUM SERPL-MCNC: 9.4 MG/DL (ref 8.6–10.4)
CHLORIDE BLD-SCNC: 99 MMOL/L (ref 98–107)
CO2: 24 MMOL/L (ref 20–31)
CREAT SERPL-MCNC: 1.65 MG/DL (ref 0.7–1.2)
DIFFERENTIAL TYPE: ABNORMAL
EOSINOPHILS RELATIVE PERCENT: 1 % (ref 0–4)
GFR AFRICAN AMERICAN: 48 ML/MIN
GFR NON-AFRICAN AMERICAN: 40 ML/MIN
GFR SERPL CREATININE-BSD FRML MDRD: ABNORMAL ML/MIN/{1.73_M2}
GFR SERPL CREATININE-BSD FRML MDRD: ABNORMAL ML/MIN/{1.73_M2}
GLUCOSE BLD-MCNC: 216 MG/DL (ref 70–99)
HCT VFR BLD CALC: 31.8 % (ref 41–53)
HEMOGLOBIN: 10.6 G/DL (ref 13.5–17.5)
IMMATURE GRANULOCYTES: ABNORMAL %
LYMPHOCYTES # BLD: 17 % (ref 24–44)
MCH RBC QN AUTO: 30.8 PG (ref 26–34)
MCHC RBC AUTO-ENTMCNC: 33.4 G/DL (ref 31–37)
MCV RBC AUTO: 92.1 FL (ref 80–100)
MONOCYTES # BLD: 6 % (ref 1–7)
NRBC AUTOMATED: ABNORMAL PER 100 WBC
PDW BLD-RTO: 15.1 % (ref 11.5–14.9)
PLATELET # BLD: 225 K/UL (ref 150–450)
PLATELET ESTIMATE: ABNORMAL
PMV BLD AUTO: 6.7 FL (ref 6–12)
POTASSIUM SERPL-SCNC: 4.9 MMOL/L (ref 3.7–5.3)
PRO-BNP: 4063 PG/ML
RBC # BLD: 3.45 M/UL (ref 4.5–5.9)
RBC # BLD: ABNORMAL 10*6/UL
SEG NEUTROPHILS: 75 % (ref 36–66)
SEGMENTED NEUTROPHILS ABSOLUTE COUNT: 7.3 K/UL (ref 1.3–9.1)
SODIUM BLD-SCNC: 135 MMOL/L (ref 135–144)
WBC # BLD: 9.5 K/UL (ref 3.5–11)
WBC # BLD: ABNORMAL 10*3/UL

## 2022-02-01 PROCEDURE — 83880 ASSAY OF NATRIURETIC PEPTIDE: CPT

## 2022-02-01 PROCEDURE — 83540 ASSAY OF IRON: CPT

## 2022-02-01 PROCEDURE — 82728 ASSAY OF FERRITIN: CPT

## 2022-02-01 PROCEDURE — 80048 BASIC METABOLIC PNL TOTAL CA: CPT

## 2022-02-01 PROCEDURE — 85025 COMPLETE CBC W/AUTO DIFF WBC: CPT

## 2022-02-01 PROCEDURE — 36415 COLL VENOUS BLD VENIPUNCTURE: CPT

## 2022-02-01 PROCEDURE — 83550 IRON BINDING TEST: CPT

## 2022-02-02 LAB
FERRITIN: 335 UG/L (ref 30–400)
IRON SATURATION: 26 % (ref 20–55)
IRON: 56 UG/DL (ref 59–158)
TOTAL IRON BINDING CAPACITY: 216 UG/DL (ref 250–450)
UNSATURATED IRON BINDING CAPACITY: 160 UG/DL (ref 112–347)

## 2022-02-09 ENCOUNTER — TELEPHONE (OUTPATIENT)
Dept: ONCOLOGY | Age: 82
End: 2022-02-09

## 2022-02-09 NOTE — TELEPHONE ENCOUNTER
rcv'd referral in 5/2021    Messages left on 5/24/21, 6/8/21, 6/28/21 with no return calls    Going through left message drawer, call placed Ryan Acharya's office and spoke with [facility].  She states to get rid of referral and if pt needs to be seen in the future they will send us a new referral. She also states that she will notify ryan acharya

## 2022-08-22 ENCOUNTER — HOSPITAL ENCOUNTER (OUTPATIENT)
Age: 82
Discharge: HOME | End: 2022-08-24
Payer: MEDICARE

## 2022-08-22 ENCOUNTER — HOSPITAL ENCOUNTER (OUTPATIENT)
Dept: GENERAL RADIOLOGY | Age: 82
Discharge: HOME | End: 2022-08-24
Payer: MEDICARE

## 2022-08-22 DIAGNOSIS — M54.40 BILATERAL LOW BACK PAIN WITH SCIATICA, SCIATICA LATERALITY UNSPECIFIED, UNSPECIFIED CHRONICITY: ICD-10-CM

## 2022-08-22 DIAGNOSIS — M54.41 MIDLINE LOW BACK PAIN WITH RIGHT-SIDED SCIATICA, UNSPECIFIED CHRONICITY: ICD-10-CM

## 2022-08-22 PROCEDURE — 72072 X-RAY EXAM THORAC SPINE 3VWS: CPT

## 2022-08-22 PROCEDURE — 72100 X-RAY EXAM L-S SPINE 2/3 VWS: CPT

## 2022-12-21 PROBLEM — M46.1 SACROILIITIS (HCC): Status: ACTIVE | Noted: 2019-06-10

## 2022-12-21 PROBLEM — I07.1 MODERATE TRICUSPID REGURGITATION: Status: ACTIVE | Noted: 2018-11-05

## 2022-12-21 PROBLEM — M10.9 GOUT: Status: ACTIVE | Noted: 2018-05-09

## 2022-12-21 PROBLEM — Q27.33 CONGENITAL ARTERIOVENOUS MALFORMATION OF GASTROINTESTINAL TRACT: Status: ACTIVE | Noted: 2018-05-14

## 2022-12-21 PROBLEM — I50.1 CONGESTIVE HEART FAILURE, CHRONIC, LEFT-SIDED (HCC): Status: ACTIVE | Noted: 2022-10-28

## 2022-12-21 PROBLEM — I25.10 ATHEROSCLEROTIC HEART DISEASE OF NATIVE CORONARY ARTERY WITHOUT ANGINA PECTORIS: Status: ACTIVE | Noted: 2022-12-21

## 2022-12-21 PROBLEM — I48.91 A-FIB (HCC): Status: ACTIVE | Noted: 2018-10-18

## 2022-12-21 PROBLEM — R00.1 BRADYCARDIA: Status: ACTIVE | Noted: 2018-04-12

## 2022-12-21 PROBLEM — E78.00 HIGH CHOLESTEROL: Status: ACTIVE | Noted: 2018-05-09

## 2022-12-21 PROBLEM — I34.0 MILD MITRAL REGURGITATION: Status: ACTIVE | Noted: 2018-11-05

## 2022-12-21 PROBLEM — I47.1 PAT (PAROXYSMAL ATRIAL TACHYCARDIA) (HCC): Status: ACTIVE | Noted: 2018-11-05

## 2022-12-21 PROBLEM — I27.20 MILD PULMONARY HYPERTENSION (HCC): Status: ACTIVE | Noted: 2018-11-05

## 2022-12-21 PROBLEM — I87.2 VENOUS STASIS DERMATITIS OF LEFT LOWER EXTREMITY: Status: ACTIVE | Noted: 2019-09-10

## 2022-12-21 PROBLEM — M16.10 PRIMARY LOCALIZED OSTEOARTHRITIS OF PELVIC REGION AND THIGH: Status: ACTIVE | Noted: 2019-06-10

## 2022-12-21 PROBLEM — I49.1 PAC (PREMATURE ATRIAL CONTRACTION): Status: ACTIVE | Noted: 2018-11-05

## 2022-12-21 PROBLEM — I42.8 NONISCHEMIC CARDIOMYOPATHY (HCC): Status: ACTIVE | Noted: 2021-12-09

## 2022-12-21 PROBLEM — I47.19 PAT (PAROXYSMAL ATRIAL TACHYCARDIA) (HCC): Status: ACTIVE | Noted: 2018-11-05

## 2022-12-21 PROBLEM — I10 ESSENTIAL HYPERTENSION: Status: ACTIVE | Noted: 2018-05-09

## 2022-12-21 PROBLEM — M10.9 GOUT OF RIGHT ELBOW: Status: ACTIVE | Noted: 2018-10-23

## 2022-12-21 PROBLEM — Z95.0 PRESENCE OF CARDIAC PACEMAKER: Status: ACTIVE | Noted: 2022-12-21

## 2022-12-21 PROBLEM — D50.9 IRON DEFICIENCY ANEMIA: Status: ACTIVE | Noted: 2018-05-14

## 2022-12-21 PROBLEM — I87.2 PERIPHERAL VENOUS INSUFFICIENCY: Status: ACTIVE | Noted: 2019-07-08

## 2022-12-21 PROBLEM — M54.30 SCIATICA: Status: ACTIVE | Noted: 2019-06-10

## 2022-12-21 PROBLEM — E27.8 ADRENAL MASS (HCC): Status: ACTIVE | Noted: 2020-08-19

## 2022-12-21 PROBLEM — E11.8 TYPE 2 DIABETES MELLITUS WITH COMPLICATION, WITHOUT LONG-TERM CURRENT USE OF INSULIN (HCC): Status: ACTIVE | Noted: 2018-02-26

## 2022-12-21 PROBLEM — I47.20 VENTRICULAR TACHYARRHYTHMIA: Status: ACTIVE | Noted: 2022-08-26

## 2022-12-21 PROBLEM — I50.23 ACUTE ON CHRONIC SYSTOLIC HEART FAILURE (HCC): Status: ACTIVE | Noted: 2020-06-18

## 2022-12-21 PROBLEM — I25.5 ISCHEMIC CARDIOMYOPATHY: Status: ACTIVE | Noted: 2018-11-05

## 2022-12-21 PROBLEM — N40.1 LOWER URINARY TRACT SYMPTOMS DUE TO BENIGN PROSTATIC HYPERPLASIA: Status: ACTIVE | Noted: 2020-01-30

## 2022-12-21 PROBLEM — I89.0 LYMPHEDEMA: Status: ACTIVE | Noted: 2021-12-09

## 2022-12-21 PROBLEM — R63.0 LOSS OF APPETITE: Status: ACTIVE | Noted: 2019-10-21

## 2022-12-21 PROBLEM — I51.9 SEVERE LEFT VENTRICULAR SYSTOLIC DYSFUNCTION: Status: ACTIVE | Noted: 2018-11-05

## 2022-12-28 ENCOUNTER — HOSPITAL ENCOUNTER (OUTPATIENT)
Dept: ULTRASOUND IMAGING | Age: 82
Discharge: HOME | End: 2022-12-30
Payer: MEDICARE

## 2022-12-28 ENCOUNTER — HOSPITAL ENCOUNTER (OUTPATIENT)
Age: 82
Setting detail: SPECIMEN
Discharge: HOME | End: 2022-12-28
Payer: MEDICARE

## 2022-12-28 DIAGNOSIS — N18.4 SECONDARY DIABETES MELLITUS WITH STAGE 4 CHRONIC KIDNEY DISEASE (HCC): ICD-10-CM

## 2022-12-28 DIAGNOSIS — I12.9 BENIGN HYPERTENSION WITH CHRONIC KIDNEY DISEASE, STAGE IV (HCC): ICD-10-CM

## 2022-12-28 DIAGNOSIS — D63.1 ANEMIA IN STAGE 4 CHRONIC KIDNEY DISEASE (HCC): ICD-10-CM

## 2022-12-28 DIAGNOSIS — N18.4 CKD (CHRONIC KIDNEY DISEASE), STAGE IV (HCC): ICD-10-CM

## 2022-12-28 DIAGNOSIS — N18.4 BENIGN HYPERTENSION WITH CHRONIC KIDNEY DISEASE, STAGE IV (HCC): ICD-10-CM

## 2022-12-28 DIAGNOSIS — E13.22 SECONDARY DIABETES MELLITUS WITH STAGE 4 CHRONIC KIDNEY DISEASE (HCC): ICD-10-CM

## 2022-12-28 DIAGNOSIS — E55.9 VITAMIN D DEFICIENCY: ICD-10-CM

## 2022-12-28 DIAGNOSIS — N18.4 ANEMIA IN STAGE 4 CHRONIC KIDNEY DISEASE (HCC): ICD-10-CM

## 2022-12-28 LAB
ANION GAP SERPL CALCULATED.3IONS-SCNC: 14 MMOL/L (ref 9–17)
BACTERIA: ABNORMAL
BILIRUBIN URINE: NEGATIVE
BUN BLDV-MCNC: 35 MG/DL (ref 8–23)
CALCIUM SERPL-MCNC: 9.7 MG/DL (ref 8.6–10.4)
CASTS UA: ABNORMAL /LPF
CHLORIDE BLD-SCNC: 98 MMOL/L (ref 98–107)
CO2: 23 MMOL/L (ref 20–31)
COLOR: YELLOW
COMPLEMENT C3: 114 MG/DL (ref 90–180)
COMPLEMENT C4: 24 MG/DL (ref 10–40)
CREAT SERPL-MCNC: 2.12 MG/DL (ref 0.7–1.2)
CREATININE URINE: 113.9 MG/DL (ref 39–259)
EPITHELIAL CELLS UA: ABNORMAL /HPF
FREE KAPPA/LAMBDA RATIO: 0.23 (ref 0.26–1.65)
GFR SERPL CREATININE-BSD FRML MDRD: 30 ML/MIN/1.73M2
GLUCOSE URINE: NEGATIVE
HCT VFR BLD CALC: 29.3 % (ref 41–53)
HEMOGLOBIN: 10 G/DL (ref 13.5–17.5)
HOURS COLLECTED: 24 H
HOURS COLLECTED: 24 H
KAPPA FREE LIGHT CHAINS QNT: 4.2 MG/DL (ref 0.37–1.94)
KETONES, URINE: NEGATIVE
LAMBDA FREE LIGHT CHAINS QNT: 18.29 MG/DL (ref 0.57–2.63)
LEUKOCYTE ESTERASE, URINE: NEGATIVE
MAGNESIUM: 1.8 MG/DL (ref 1.6–2.6)
MICROALBUMIN/CREAT 24H UR: 55 MG/L
MICROALBUMIN/CREAT UR-RTO: 48 MCG/MG CREAT
NITRITE, URINE: NEGATIVE
PH UA: 5.5 (ref 5–8)
PHOSPHORUS: 3.8 MG/DL (ref 2.5–4.5)
POTASSIUM SERPL-SCNC: 3.5 MMOL/L (ref 3.7–5.3)
PROTEIN 24 HOUR URINE: 168 MG/24 H
PROTEIN UA: ABNORMAL
RBC UA: ABNORMAL /HPF
SODIUM 24 HOUR URINE: 42 MMOL/24 H (ref 40–220)
SODIUM BLD-SCNC: 135 MMOL/L (ref 135–144)
SODIUM URINE: 35 MMOL/L
SPECIFIC GRAVITY UA: 1.01 (ref 1–1.03)
TURBIDITY: CLEAR
URINE HGB: NEGATIVE
URINE TOTAL PROTEIN: 14 MG/DL
UROBILINOGEN, URINE: NORMAL
VITAMIN D 25-HYDROXY: 62.2 NG/ML
VOLUME: 1200 ML
VOLUME: 1200 ML
WBC UA: ABNORMAL /HPF

## 2022-12-28 PROCEDURE — 81001 URINALYSIS AUTO W/SCOPE: CPT

## 2022-12-28 PROCEDURE — 84156 ASSAY OF PROTEIN URINE: CPT

## 2022-12-28 PROCEDURE — 83521 IG LIGHT CHAINS FREE EACH: CPT

## 2022-12-28 PROCEDURE — 86160 COMPLEMENT ANTIGEN: CPT

## 2022-12-28 PROCEDURE — 82570 ASSAY OF URINE CREATININE: CPT

## 2022-12-28 PROCEDURE — 85018 HEMOGLOBIN: CPT

## 2022-12-28 PROCEDURE — 82306 VITAMIN D 25 HYDROXY: CPT

## 2022-12-28 PROCEDURE — 83735 ASSAY OF MAGNESIUM: CPT

## 2022-12-28 PROCEDURE — 76770 US EXAM ABDO BACK WALL COMP: CPT

## 2022-12-28 PROCEDURE — 80051 ELECTROLYTE PANEL: CPT

## 2022-12-28 PROCEDURE — 82565 ASSAY OF CREATININE: CPT

## 2022-12-28 PROCEDURE — 36415 COLL VENOUS BLD VENIPUNCTURE: CPT

## 2022-12-28 PROCEDURE — 85014 HEMATOCRIT: CPT

## 2022-12-28 PROCEDURE — 84300 ASSAY OF URINE SODIUM: CPT

## 2022-12-28 PROCEDURE — 86038 ANTINUCLEAR ANTIBODIES: CPT

## 2022-12-28 PROCEDURE — 82043 UR ALBUMIN QUANTITATIVE: CPT

## 2022-12-28 PROCEDURE — 82310 ASSAY OF CALCIUM: CPT

## 2022-12-28 PROCEDURE — 84100 ASSAY OF PHOSPHORUS: CPT

## 2022-12-28 PROCEDURE — 86162 COMPLEMENT TOTAL (CH50): CPT

## 2022-12-28 PROCEDURE — 84520 ASSAY OF UREA NITROGEN: CPT

## 2022-12-28 PROCEDURE — 86225 DNA ANTIBODY NATIVE: CPT

## 2022-12-30 LAB
ANTI DNA DOUBLE STRANDED: 2 IU/ML
ANTI-NUCLEAR ANTIBODY (ANA): NEGATIVE
COMPLEMENT TOTAL (CH50): 77.2 U/ML (ref 38.7–89.9)
ENA ANTIBODIES SCREEN: 0.2 U/ML

## 2023-03-29 ENCOUNTER — HOSPITAL ENCOUNTER (OUTPATIENT)
Age: 83
Discharge: HOME | End: 2023-03-29
Payer: MEDICARE

## 2023-03-29 LAB
ABSOLUTE EOS #: 0.2 K/UL (ref 0–0.4)
ABSOLUTE LYMPH #: 1.4 K/UL (ref 1–4.8)
ABSOLUTE MONO #: 0.6 K/UL (ref 0.1–1.3)
ANION GAP SERPL CALCULATED.3IONS-SCNC: 13 MMOL/L (ref 9–17)
BASOPHILS # BLD: 1 % (ref 0–2)
BASOPHILS ABSOLUTE: 0 K/UL (ref 0–0.2)
BILIRUBIN URINE: NEGATIVE
BNP SERPL-MCNC: 2975 PG/ML
BUN SERPL-MCNC: 58 MG/DL (ref 8–23)
CALCIUM SERPL-MCNC: 9.3 MG/DL (ref 8.6–10.4)
CHLORIDE SERPL-SCNC: 101 MMOL/L (ref 98–107)
CO2 SERPL-SCNC: 24 MMOL/L (ref 20–31)
COLOR: YELLOW
COMMENT UA: NORMAL
CREAT SERPL-MCNC: 2.81 MG/DL (ref 0.7–1.2)
EOSINOPHILS RELATIVE PERCENT: 3 % (ref 0–4)
FERRITIN SERPL-MCNC: 107 NG/ML (ref 30–400)
FOLATE SERPL-MCNC: 7.1 NG/ML
GFR SERPL CREATININE-BSD FRML MDRD: 21 ML/MIN/1.73M2
GLUCOSE SERPL-MCNC: 170 MG/DL (ref 70–99)
GLUCOSE UR STRIP.AUTO-MCNC: NEGATIVE MG/DL
HCT VFR BLD AUTO: 28.4 % (ref 41–53)
HGB BLD-MCNC: 9.6 G/DL (ref 13.5–17.5)
IRON SATURATION: 24 % (ref 20–55)
IRON SERPL-MCNC: 62 UG/DL (ref 59–158)
KETONES UR STRIP.AUTO-MCNC: NEGATIVE MG/DL
LEUKOCYTE ESTERASE UR QL STRIP.AUTO: NEGATIVE
LYMPHOCYTES # BLD: 19 % (ref 24–44)
MAGNESIUM SERPL-MCNC: 2.2 MG/DL (ref 1.6–2.6)
MCH RBC QN AUTO: 31.3 PG (ref 26–34)
MCHC RBC AUTO-ENTMCNC: 33.8 G/DL (ref 31–37)
MCV RBC AUTO: 92.7 FL (ref 80–100)
MONOCYTES # BLD: 8 % (ref 1–7)
NITRITE UR QL STRIP.AUTO: NEGATIVE
PDW BLD-RTO: 16.8 % (ref 11.5–14.9)
PLATELET # BLD AUTO: 167 K/UL (ref 150–450)
PMV BLD AUTO: 8.1 FL (ref 6–12)
POTASSIUM SERPL-SCNC: 4.5 MMOL/L (ref 3.7–5.3)
PROT UR STRIP.AUTO-MCNC: 6 MG/DL (ref 5–8)
PROT UR STRIP.AUTO-MCNC: NEGATIVE MG/DL
RBC # BLD: 3.06 M/UL (ref 4.5–5.9)
SEG NEUTROPHILS: 69 % (ref 36–66)
SEGMENTED NEUTROPHILS ABSOLUTE COUNT: 5.1 K/UL (ref 1.3–9.1)
SODIUM SERPL-SCNC: 138 MMOL/L (ref 135–144)
SPECIFIC GRAVITY UA: 1.01 (ref 1–1.03)
TIBC SERPL-MCNC: 263 UG/DL (ref 250–450)
TURBIDITY: CLEAR
UNSATURATED IRON BINDING CAPACITY: 201 UG/DL (ref 112–347)
URINE HGB: NEGATIVE
UROBILINOGEN, URINE: NORMAL
VIT B12 SERPL-MCNC: 1090 PG/ML (ref 232–1245)
WBC # BLD AUTO: 7.3 K/UL (ref 3.5–11)

## 2023-03-29 PROCEDURE — 83735 ASSAY OF MAGNESIUM: CPT

## 2023-03-29 PROCEDURE — 82746 ASSAY OF FOLIC ACID SERUM: CPT

## 2023-03-29 PROCEDURE — 85025 COMPLETE CBC W/AUTO DIFF WBC: CPT

## 2023-03-29 PROCEDURE — 82728 ASSAY OF FERRITIN: CPT

## 2023-03-29 PROCEDURE — 80048 BASIC METABOLIC PNL TOTAL CA: CPT

## 2023-03-29 PROCEDURE — 83540 ASSAY OF IRON: CPT

## 2023-03-29 PROCEDURE — 36415 COLL VENOUS BLD VENIPUNCTURE: CPT

## 2023-03-29 PROCEDURE — 83880 ASSAY OF NATRIURETIC PEPTIDE: CPT

## 2023-03-29 PROCEDURE — 82607 VITAMIN B-12: CPT

## 2023-03-29 PROCEDURE — 83550 IRON BINDING TEST: CPT

## 2023-03-29 PROCEDURE — 81003 URINALYSIS AUTO W/O SCOPE: CPT

## 2023-04-01 ENCOUNTER — HOSPITAL ENCOUNTER (EMERGENCY)
Age: 83
Discharge: HOME | End: 2023-04-01
Attending: EMERGENCY MEDICINE
Payer: MEDICARE

## 2023-04-01 ENCOUNTER — APPOINTMENT (OUTPATIENT)
Dept: CT IMAGING | Age: 83
End: 2023-04-01
Payer: MEDICARE

## 2023-04-01 VITALS
BODY MASS INDEX: 25.67 KG/M2 | WEIGHT: 200 LBS | HEART RATE: 60 BPM | SYSTOLIC BLOOD PRESSURE: 155 MMHG | RESPIRATION RATE: 14 BRPM | DIASTOLIC BLOOD PRESSURE: 75 MMHG | OXYGEN SATURATION: 100 % | HEIGHT: 74 IN | TEMPERATURE: 97.6 F

## 2023-04-01 DIAGNOSIS — R42 DIZZINESS: Primary | ICD-10-CM

## 2023-04-01 LAB
ABSOLUTE EOS #: 0.1 K/UL (ref 0–0.4)
ABSOLUTE LYMPH #: 0.8 K/UL (ref 1–4.8)
ABSOLUTE MONO #: 0.4 K/UL (ref 0.1–1.3)
ALBUMIN SERPL-MCNC: 4.1 G/DL (ref 3.5–5.2)
ALP SERPL-CCNC: 87 U/L (ref 40–129)
ALT SERPL-CCNC: 9 U/L (ref 5–41)
ANION GAP SERPL CALCULATED.3IONS-SCNC: 11 MMOL/L (ref 9–17)
AST SERPL-CCNC: 10 U/L
BASOPHILS # BLD: 0 % (ref 0–2)
BASOPHILS ABSOLUTE: 0 K/UL (ref 0–0.2)
BILIRUB SERPL-MCNC: 0.5 MG/DL (ref 0.3–1.2)
BNP SERPL-MCNC: 2614 PG/ML
BUN SERPL-MCNC: 56 MG/DL (ref 8–23)
CALCIUM SERPL-MCNC: 9.1 MG/DL (ref 8.6–10.4)
CHLORIDE SERPL-SCNC: 105 MMOL/L (ref 98–107)
CO2 SERPL-SCNC: 23 MMOL/L (ref 20–31)
CREAT SERPL-MCNC: 2.64 MG/DL (ref 0.7–1.2)
EOSINOPHILS RELATIVE PERCENT: 2 % (ref 0–4)
GFR SERPL CREATININE-BSD FRML MDRD: 23 ML/MIN/1.73M2
GLUCOSE SERPL-MCNC: 199 MG/DL (ref 70–99)
HCT VFR BLD AUTO: 26.2 % (ref 41–53)
HGB BLD-MCNC: 9.2 G/DL (ref 13.5–17.5)
INR PPP: 1.2
LYMPHOCYTES # BLD: 10 % (ref 24–44)
MAGNESIUM SERPL-MCNC: 2.2 MG/DL (ref 1.6–2.6)
MCH RBC QN AUTO: 32.3 PG (ref 26–34)
MCHC RBC AUTO-ENTMCNC: 35.2 G/DL (ref 31–37)
MCV RBC AUTO: 91.9 FL (ref 80–100)
MONOCYTES # BLD: 6 % (ref 1–7)
PDW BLD-RTO: 16.4 % (ref 11.5–14.9)
PLATELET # BLD AUTO: 139 K/UL (ref 150–450)
PMV BLD AUTO: 7.9 FL (ref 6–12)
POTASSIUM SERPL-SCNC: 4.6 MMOL/L (ref 3.7–5.3)
PROT SERPL-MCNC: 6.5 G/DL (ref 6.4–8.3)
PROTHROMBIN TIME: 15.4 SEC (ref 11.8–14.6)
RBC # BLD: 2.85 M/UL (ref 4.5–5.9)
SEG NEUTROPHILS: 82 % (ref 36–66)
SEGMENTED NEUTROPHILS ABSOLUTE COUNT: 6.2 K/UL (ref 1.3–9.1)
SODIUM SERPL-SCNC: 139 MMOL/L (ref 135–144)
TROPONIN I SERPL DL<=0.01 NG/ML-MCNC: 36 NG/L (ref 0–22)
TROPONIN I SERPL DL<=0.01 NG/ML-MCNC: 38 NG/L (ref 0–22)
WBC # BLD AUTO: 7.6 K/UL (ref 3.5–11)

## 2023-04-01 PROCEDURE — 6370000000 HC RX 637 (ALT 250 FOR IP): Performed by: EMERGENCY MEDICINE

## 2023-04-01 PROCEDURE — 93005 ELECTROCARDIOGRAM TRACING: CPT | Performed by: EMERGENCY MEDICINE

## 2023-04-01 PROCEDURE — 80053 COMPREHEN METABOLIC PANEL: CPT

## 2023-04-01 PROCEDURE — 84484 ASSAY OF TROPONIN QUANT: CPT

## 2023-04-01 PROCEDURE — 70450 CT HEAD/BRAIN W/O DYE: CPT

## 2023-04-01 PROCEDURE — 83735 ASSAY OF MAGNESIUM: CPT

## 2023-04-01 PROCEDURE — 36415 COLL VENOUS BLD VENIPUNCTURE: CPT

## 2023-04-01 PROCEDURE — 85025 COMPLETE CBC W/AUTO DIFF WBC: CPT

## 2023-04-01 PROCEDURE — 99284 EMERGENCY DEPT VISIT MOD MDM: CPT

## 2023-04-01 PROCEDURE — 83880 ASSAY OF NATRIURETIC PEPTIDE: CPT

## 2023-04-01 PROCEDURE — 85610 PROTHROMBIN TIME: CPT

## 2023-04-01 RX ORDER — MECLIZINE HYDROCHLORIDE 25 MG/1
25 TABLET ORAL ONCE
Status: COMPLETED | OUTPATIENT
Start: 2023-04-01 | End: 2023-04-01

## 2023-04-01 RX ORDER — MECLIZINE HCL 25MG 25 MG/1
25 TABLET, CHEWABLE ORAL 3 TIMES DAILY PRN
Qty: 30 TABLET | Refills: 0 | Status: SHIPPED | OUTPATIENT
Start: 2023-04-01

## 2023-04-01 RX ADMIN — MECLIZINE HYDROCHLORIDE 25 MG: 25 TABLET ORAL at 08:58

## 2023-04-01 ASSESSMENT — ENCOUNTER SYMPTOMS
BLOOD IN STOOL: 0
VOMITING: 0
DIARRHEA: 0
NAUSEA: 0
SHORTNESS OF BREATH: 0
VISUAL CHANGE: 0

## 2023-04-01 ASSESSMENT — LIFESTYLE VARIABLES
HOW MANY STANDARD DRINKS CONTAINING ALCOHOL DO YOU HAVE ON A TYPICAL DAY: PATIENT DOES NOT DRINK
HOW OFTEN DO YOU HAVE A DRINK CONTAINING ALCOHOL: NEVER

## 2023-04-01 ASSESSMENT — PAIN - FUNCTIONAL ASSESSMENT
PAIN_FUNCTIONAL_ASSESSMENT: NONE - DENIES PAIN
PAIN_FUNCTIONAL_ASSESSMENT: NONE - DENIES PAIN

## 2023-04-01 NOTE — ED NOTES
Call placed to Josef Watters [phone] with voicemail message left for desired return call.       Vinicio Harris RN  04/01/23 0363

## 2023-04-01 NOTE — ED NOTES
Contact made with Henrry Tavera, daughter of patient, whom was made aware that patient was present in ER with plans to discharge. Family will come to ER to review findings with provider and facilitate transport to patients home. Updated contact number received from family and added to contact profile.       Merita Merlin, RN  04/01/23 1125

## 2023-04-01 NOTE — ED NOTES
Call placed to patient's daughter, Yue Siegel; [phone] with message left with family member that patient was in the ER. No questions noted at this time.       Bryan Hernandez RN  04/01/23 2059

## 2023-04-01 NOTE — ED NOTES
Discharge instructions reviewed with patient, noting all directions and education by provider. Patient verbalizes understanding of all information reviewed, gathered personal items, and transferred under own power off unit to lobby without incident.      Merita Merlin, RN  04/01/23 4917

## 2023-04-01 NOTE — ED NOTES
Call attempted to patient's daughter, Dany Wells, [phone] with message left on voice mail.       Andie Rodriguez RN  04/01/23 1600

## 2023-04-01 NOTE — ED PROVIDER NOTES
CAD (coronary artery disease)     Cancer ([facility])     skin    CHF (congestive heart failure) ([facility])     Diabetes mellitus ([facility]     History of blood transfusion     Hyperlipidemia     Hypertension     Pacemaker      Past Problem List  Patient Active Problem List   Diagnosis Code    Age-related osteoporosis with current pathological fracture M80.00XA    Stage 3b chronic kidney disease ([facility] [address] N18.32    Acute on chronic systolic heart failure ([facility]) I50.23    Adrenal mass ([facility]) E27.8    Sacroiliitis ([facility]) M46.1    Lymphedema I89.0    Anemia D64.9    Atherosclerotic heart disease of native coronary artery without angina pectoris I25.10    Bradycardia R00.1    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    Congestive heart failure, chronic, left-sided ([facility]) I50.1    Constipation K59.00    Dark stools R19.5    Essential hypertension I10    Gout M10.9    Gout of right elbow M10.9    High cholesterol E78.00    Iron deficiency anemia D50.9    Ischemic cardiomyopathy I25.5    Loss of appetite R63.0    Lower urinary tract symptoms due to benign prostatic hyperplasia N40.1    Mild mitral regurgitation I34.0    Mild pulmonary hypertension ([facility]) I27.20    Moderate tricuspid regurgitation I07.1    Nonischemic cardiomyopathy ([facility]) I42.8    PAC (premature atrial contraction) I49.1    PAT (paroxysmal atrial tachycardia) ([facility]) I47.1    Peripheral venous insufficiency I87.2    Presence of cardiac pacemaker Z95.0    Primary localized osteoarthritis of pelvic region and thigh M16.10    Rectal hemorrhage K62.5    Sciatica M54.30    Severe left ventricular systolic dysfunction C57.9    Type 2 diabetes mellitus with complication, without long-term current use of insulin ([facility]) E11.8    A-fib ([facility]) I48.91    Venous stasis dermatitis of left lower extremity I87.2    Ventricular tachyarrhythmia ([facility]) I47.20     SURGICAL HISTORY       Past Surgical History:   Procedure Laterality Date    ATRIAL CARDIAC PACEMAKER INSERTION REVIEWED BY THE ED CLINICIAN:    RADIOLOGY: All x-rays, CT, MRI, and formal ultrasound images (except ED bedside ultrasound) are read by the radiologist, see reports below, unless otherwise noted in MDM or here. Reports below are reviewed by myself. CT HEAD WO CONTRAST   Final Result   No acute intracranial abnormality. Senescent changes including chronic   microvascular change. Advanced atherosclerotic disease of the major   intracranial vessels including the vertebral arteries. LABS: Lab orders shown below, the results are reviewed by myself, and all abnormals are listed below.   Labs Reviewed   CBC WITH AUTO DIFFERENTIAL - Abnormal; Notable for the following components:       Result Value    RBC 2.85 (*)     Hemoglobin 9.2 (*)     Hematocrit 26.2 (*)     RDW 16.4 (*)     Platelets 409 (*)     Seg Neutrophils 82 (*)     Lymphocytes 10 (*)     Absolute Lymph # 0.80 (*)     All other components within normal limits   COMPREHENSIVE METABOLIC PANEL - Abnormal; Notable for the following components:    Glucose 199 (*)     BUN 56 (*)     Creatinine 2.64 (*)     Est, Glom Filt Rate 23 (*)     All other components within normal limits   BRAIN NATRIURETIC PEPTIDE - Abnormal; Notable for the following components:    Pro-BNP 2,614 (*)     All other components within normal limits   TROPONIN - Abnormal; Notable for the following components:    Troponin, High Sensitivity 38 (*)     All other components within normal limits   TROPONIN - Abnormal; Notable for the following components:    Troponin, High Sensitivity 36 (*)     All other components within normal limits   PROTIME-INR - Abnormal; Notable for the following components:    Protime 15.4 (*)     All other components within normal limits   MAGNESIUM       Vitals Reviewed:    Vitals:    04/01/23 1013 04/01/23 1028 04/01/23 1043 04/01/23 1130   BP: 138/68 (!) 144/67 (!) 144/68 (!) 155/75   Pulse: 67 60 60 60   Resp: 12 11 13 14   Temp:    97.6 °F (36.4 °C)

## 2023-04-01 NOTE — ED NOTES
Reviewed ECG with missed pacer spike on cardiac rhythm due to PVC. Dr. Jose Jorgensen reviewed with no new orders received.       Jagdish Waller RN  04/01/23 2159

## 2023-04-01 NOTE — ED TRIAGE NOTES
Mode of arrival (squad #, walk in, police, etc) : squad        Chief complaint(s): Dizziness        Arrival Note (brief scenario, treatment PTA, etc). : Pt was helping wife ambulate when he felt a wave a dizziness. Pt reports feeling dizzy for the past week. Pacemaker installed roughly 5 weeks ago. C= \"Have you ever felt that you should Cut down on your drinking? \"  No  A= \"Have people Annoyed you by criticizing your drinking? \"  No  G= \"Have you ever felt bad or Guilty about your drinking? \"  No  E= \"Have you ever had a drink as an Eye-opener first thing in the morning to steady your nerves or to help a hangover? \"  No      Deferred []      Reason for deferring: N/A    *If yes to two or more: probable alcohol abuse. *

## 2023-04-03 LAB
EKG ATRIAL RATE: 41 BPM
EKG Q-T INTERVAL: 464 MS
EKG QRS DURATION: 150 MS
EKG QTC CALCULATION (BAZETT): 464 MS
EKG R AXIS: 155 DEGREES
EKG T AXIS: 74 DEGREES
EKG VENTRICULAR RATE: 60 BPM

## 2023-04-03 PROCEDURE — 93010 ELECTROCARDIOGRAM REPORT: CPT | Performed by: INTERNAL MEDICINE

## 2024-05-31 ENCOUNTER — OUTSIDE SERVICES (OUTPATIENT)
Dept: PRIMARY CARE CLINIC | Age: 84
End: 2024-05-31

## 2024-05-31 DIAGNOSIS — L85.3 DRY SKIN DERMATITIS: ICD-10-CM

## 2024-05-31 DIAGNOSIS — R29.6 FREQUENT FALLS: ICD-10-CM

## 2024-05-31 DIAGNOSIS — S32.592D CLOSED FRACTURE OF RAMUS OF LEFT PUBIS WITH ROUTINE HEALING, SUBSEQUENT ENCOUNTER: Primary | ICD-10-CM

## 2024-05-31 DIAGNOSIS — I50.1 CONGESTIVE HEART FAILURE, CHRONIC, LEFT-SIDED (HCC): ICD-10-CM

## 2024-05-31 ASSESSMENT — ENCOUNTER SYMPTOMS
VOMITING: 0
RHINORRHEA: 0
EYE DISCHARGE: 0
SHORTNESS OF BREATH: 0
BACK PAIN: 1
EYE REDNESS: 0
DIARRHEA: 0
NAUSEA: 0
ABDOMINAL PAIN: 0
WHEEZING: 0
SORE THROAT: 0
COUGH: 0

## 2024-06-01 NOTE — PROGRESS NOTES
Jalen Guy is a 84 y.o. male being seen for his weekly follow up.  Location of visit: [facility]    Visit Date:  5/31/2024     Reason for Visit:    Chief Complaint   Patient presents with    Pelvic Pain        Pelvic Pain  Associated symptoms include myalgias (left leg). Pertinent negatives include no abdominal pain, arthralgias, chest pain, chills, coughing, fever, headaches, nausea, sore throat or vomiting.      Resident has fall at home and fractured left pubis ramus.  No operation.   He has wound on posterior left leg and left hand. Nurse changed dressings this morning and reports wounds are healing.  He has history of CKD that fluctuates between 3 an 4.  He has history of CHF but denies any SOB or chest pain.    Review of Systems   Constitutional:  Negative for chills and fever.   HENT:  Negative for rhinorrhea and sore throat.    Eyes:  Negative for discharge and redness.   Respiratory:  Negative for cough, shortness of breath and wheezing.    Cardiovascular:  Negative for chest pain and palpitations.   Gastrointestinal:  Negative for abdominal pain, diarrhea, nausea and vomiting.   Genitourinary:  Positive for pelvic pain. Negative for dysuria and frequency.   Musculoskeletal:  Positive for back pain, gait problem and myalgias (left leg). Negative for arthralgias.   Neurological:  Negative for dizziness, light-headedness and headaches.   Psychiatric/Behavioral:  Positive for confusion. Negative for sleep disturbance. The patient is not nervous/anxious.         Physical Exam  Vitals and nursing note reviewed.   Constitutional:       General: He is not in acute distress.     Appearance: He is well-developed. He is not ill-appearing.   HENT:      Head: Normocephalic and atraumatic.      Right Ear: External ear normal.      Left Ear: External ear normal.   Eyes:      General: No scleral icterus.        Right eye: No discharge.         Left eye: No discharge.      Conjunctiva/sclera: Conjunctivae

## 2024-06-04 ENCOUNTER — OUTSIDE SERVICES (OUTPATIENT)
Dept: PRIMARY CARE CLINIC | Age: 84
End: 2024-06-04

## 2024-06-04 DIAGNOSIS — S32.592A PUBIC RAMUS FRACTURE, LEFT, CLOSED, INITIAL ENCOUNTER (HCC): ICD-10-CM

## 2024-06-04 DIAGNOSIS — R10.31 RIGHT GROIN PAIN: Primary | ICD-10-CM

## 2024-06-04 DIAGNOSIS — N18.4 CKD (CHRONIC KIDNEY DISEASE) STAGE 4, GFR 15-29 ML/MIN (HCC): ICD-10-CM

## 2024-06-04 DIAGNOSIS — E11.8 TYPE 2 DIABETES MELLITUS WITH COMPLICATION, WITHOUT LONG-TERM CURRENT USE OF INSULIN (HCC): ICD-10-CM

## 2024-06-04 DIAGNOSIS — I10 ESSENTIAL HYPERTENSION: ICD-10-CM

## 2024-06-04 ASSESSMENT — ENCOUNTER SYMPTOMS
NAUSEA: 0
COUGH: 0
SHORTNESS OF BREATH: 0
VOMITING: 0
DIARRHEA: 0

## 2024-06-04 NOTE — PROGRESS NOTES
Jalen Guy is a 84 y.o. male being seen for his weekly follow up.  Location of visit: [facility]    HPI:  New today:  Pt admitted after fall with pubic ramus fracture.  Pt currently c/o left groin pain.  Participating with therapy.  No c/o CP or SOB today. No new issues otherwise per pt, therapy or nursing.         Review of Systems   Constitutional:  Negative for activity change, appetite change, chills, diaphoresis and fever.   HENT:  Negative for congestion.    Respiratory:  Negative for cough and shortness of breath.    Cardiovascular:  Negative for chest pain and palpitations.   Gastrointestinal:  Negative for diarrhea, nausea and vomiting.   Genitourinary:  Negative for hematuria.   Musculoskeletal:  Negative for arthralgias and myalgias.   Skin:  Negative for rash.   Neurological:  Negative for weakness and headaches.   Psychiatric/Behavioral:  Negative for agitation, dysphoric mood and sleep disturbance. The patient is not nervous/anxious.           Physical Exam  Vitals reviewed.   Constitutional:       General: He is not in acute distress.  HENT:      Head: Normocephalic and atraumatic.      Nose: No congestion or rhinorrhea.   Eyes:      General:         Right eye: No discharge.         Left eye: No discharge.   Cardiovascular:      Rate and Rhythm: Normal rate and regular rhythm.   Pulmonary:      Effort: Pulmonary effort is normal. No respiratory distress.      Breath sounds: Normal breath sounds.   Abdominal:      Palpations: Abdomen is soft.      Tenderness: There is no abdominal tenderness.   Musculoskeletal:      Right lower leg: No edema.      Left lower leg: No edema.   Skin:     General: Skin is warm and dry.   Neurological:      Mental Status: He is alert. Mental status is at baseline.          ASSESSMENT:   Diagnosis Orders   1. Right groin pain        2. Pubic ramus fracture, left, closed, initial encounter ([facility])        3. CKD (chronic kidney disease) stage 4, GFR 15-29 ml/min

## 2024-06-11 ENCOUNTER — OUTSIDE SERVICES (OUTPATIENT)
Dept: PRIMARY CARE CLINIC | Age: 84
End: 2024-06-11

## 2024-06-11 DIAGNOSIS — E11.8 TYPE 2 DIABETES MELLITUS WITH COMPLICATION, WITHOUT LONG-TERM CURRENT USE OF INSULIN (HCC): ICD-10-CM

## 2024-06-11 DIAGNOSIS — I50.1 CONGESTIVE HEART FAILURE, CHRONIC, LEFT-SIDED (HCC): ICD-10-CM

## 2024-06-11 DIAGNOSIS — S32.592A PUBIC RAMUS FRACTURE, LEFT, CLOSED, INITIAL ENCOUNTER (HCC): Primary | ICD-10-CM

## 2024-06-11 ASSESSMENT — ENCOUNTER SYMPTOMS
COUGH: 0
DIARRHEA: 0
NAUSEA: 0
SHORTNESS OF BREATH: 0
VOMITING: 0

## 2024-06-11 NOTE — PROGRESS NOTES
Jalen Guy is a 84 y.o. male being seen for his weekly follow up.  Location of visit: [facility]    HPI:  New today:  Pt denies any new issues.  No increased pain- groin and left leg are improving.          Review of Systems   Constitutional:  Negative for activity change, appetite change, chills, diaphoresis and fever.   HENT:  Negative for congestion.    Respiratory:  Negative for cough and shortness of breath.    Cardiovascular:  Negative for chest pain and palpitations.   Gastrointestinal:  Negative for diarrhea, nausea and vomiting.   Genitourinary:  Negative for hematuria.   Musculoskeletal:  Positive for arthralgias. Negative for myalgias.   Skin:  Negative for rash.   Neurological:  Negative for weakness and headaches.   Psychiatric/Behavioral:  Negative for agitation, dysphoric mood and sleep disturbance. The patient is not nervous/anxious.           Physical Exam  Vitals reviewed.   Constitutional:       General: He is not in acute distress.  HENT:      Head: Normocephalic and atraumatic.      Nose: No congestion or rhinorrhea.   Eyes:      General:         Right eye: No discharge.         Left eye: No discharge.   Cardiovascular:      Rate and Rhythm: Normal rate and regular rhythm.   Pulmonary:      Effort: Pulmonary effort is normal. No respiratory distress.      Breath sounds: Normal breath sounds.   Abdominal:      Palpations: Abdomen is soft.      Tenderness: There is no abdominal tenderness.   Musculoskeletal:      Right lower leg: No edema.      Left lower leg: No edema.   Skin:     General: Skin is warm and dry.   Neurological:      Mental Status: He is alert. Mental status is at baseline.          ASSESSMENT:   Diagnosis Orders   1. Pubic ramus fracture, left, closed, initial encounter ([facility])        2. Type 2 diabetes mellitus with complication, without long-term current use of insulin ([facility])        3. Congestive heart failure, chronic, left-sided ([facility])            PLAN:  1. Pubic ramus

## 2024-06-18 ENCOUNTER — OUTSIDE SERVICES (OUTPATIENT)
Dept: PRIMARY CARE CLINIC | Age: 84
End: 2024-06-18

## 2024-06-18 DIAGNOSIS — S32.592D CLOSED FRACTURE OF RAMUS OF LEFT PUBIS WITH ROUTINE HEALING, SUBSEQUENT ENCOUNTER: ICD-10-CM

## 2024-06-18 DIAGNOSIS — R29.6 FREQUENT FALLS: ICD-10-CM

## 2024-06-18 DIAGNOSIS — E11.8 TYPE 2 DIABETES MELLITUS WITH COMPLICATION, WITHOUT LONG-TERM CURRENT USE OF INSULIN (HCC): Primary | ICD-10-CM

## 2024-06-18 ASSESSMENT — ENCOUNTER SYMPTOMS
SHORTNESS OF BREATH: 0
WHEEZING: 0
SORE THROAT: 0
BACK PAIN: 1
CONSTIPATION: 0
DIARRHEA: 0
EYE REDNESS: 0
COUGH: 0
EYE DISCHARGE: 0
NAUSEA: 0
VOMITING: 0
RHINORRHEA: 0
ABDOMINAL PAIN: 0

## 2024-06-18 NOTE — PROGRESS NOTES
Jalen Guy is a 84 y.o. male being seen for his weekly follow up.  Location of visit: [facility]    Visit Date:  6/18/2024     Reason for Visit:    Chief Complaint   Patient presents with    Pelvic Pain        Pelvic Pain  Associated symptoms include arthralgias and weakness. Pertinent negatives include no abdominal pain, chest pain, chills, coughing, fatigue, fever, headaches, myalgias, nausea, rash, sore throat or vomiting.      Resident continues to rehabilitate from pubic fracture after a fall.  He relates his particularly painful when he stands.  Requires assistance for transfers.    He typically does not check blood sugars at home.  He has been on glimepiride at home.    Relates therapy is going well.    Review of Systems   Constitutional:  Negative for chills, fatigue and fever.   HENT:  Negative for rhinorrhea and sore throat.    Eyes:  Negative for discharge and redness.   Respiratory:  Negative for cough, shortness of breath and wheezing.    Cardiovascular:  Positive for leg swelling. Negative for chest pain and palpitations.   Gastrointestinal:  Negative for abdominal pain, constipation, diarrhea, nausea and vomiting.   Genitourinary:  Positive for pelvic pain. Negative for dysuria and frequency.   Musculoskeletal:  Positive for arthralgias, back pain and gait problem. Negative for myalgias.   Skin:  Negative for rash and wound.   Neurological:  Positive for weakness. Negative for dizziness, light-headedness and headaches.   Psychiatric/Behavioral:  Negative for dysphoric mood and sleep disturbance. The patient is not nervous/anxious.         Physical Exam  Vitals and nursing note reviewed.   Constitutional:       Appearance: Normal appearance.   HENT:      Head: Normocephalic and atraumatic.   Cardiovascular:      Rate and Rhythm: Normal rate and regular rhythm.      Heart sounds: Murmur heard.   Pulmonary:      Effort: Pulmonary effort is normal.      Breath sounds: Normal breath sounds.

## 2024-06-25 ENCOUNTER — OUTSIDE SERVICES (OUTPATIENT)
Dept: PRIMARY CARE CLINIC | Age: 84
End: 2024-06-25

## 2024-06-25 DIAGNOSIS — R41.3 MEMORY PROBLEM: ICD-10-CM

## 2024-06-25 DIAGNOSIS — I10 ESSENTIAL HYPERTENSION: ICD-10-CM

## 2024-06-25 DIAGNOSIS — N18.4 CKD (CHRONIC KIDNEY DISEASE) STAGE 4, GFR 15-29 ML/MIN (HCC): ICD-10-CM

## 2024-06-25 DIAGNOSIS — E11.8 TYPE 2 DIABETES MELLITUS WITH COMPLICATION, WITHOUT LONG-TERM CURRENT USE OF INSULIN (HCC): Primary | ICD-10-CM

## 2024-06-25 DIAGNOSIS — S32.592A PUBIC RAMUS FRACTURE, LEFT, CLOSED, INITIAL ENCOUNTER (HCC): ICD-10-CM

## 2024-06-25 ASSESSMENT — ENCOUNTER SYMPTOMS
NAUSEA: 0
SHORTNESS OF BREATH: 0
DIARRHEA: 0
VOMITING: 0
COUGH: 0

## 2024-06-25 NOTE — PROGRESS NOTES
Jalen Guy is a 84 y.o. male being seen for his weekly follow up.  Location of visit: [facility]    HPI:  New today:  Pt denies any complaints today.  No new issues.  PT/OT noticing some improvement.          Review of Systems   Constitutional:  Negative for activity change, appetite change, chills, diaphoresis and fever.   HENT:  Negative for congestion.    Respiratory:  Negative for cough and shortness of breath.    Cardiovascular:  Negative for chest pain and palpitations.   Gastrointestinal:  Negative for diarrhea, nausea and vomiting.   Genitourinary:  Negative for hematuria.   Musculoskeletal:  Negative for arthralgias and myalgias.   Skin:  Negative for rash.   Neurological:  Negative for weakness and headaches.   Psychiatric/Behavioral:  Negative for agitation, dysphoric mood and sleep disturbance. The patient is not nervous/anxious.           Physical Exam  Vitals reviewed.   Constitutional:       General: He is not in acute distress.  HENT:      Head: Normocephalic and atraumatic.      Nose: No congestion or rhinorrhea.   Eyes:      General:         Right eye: No discharge.         Left eye: No discharge.   Cardiovascular:      Rate and Rhythm: Normal rate and regular rhythm.   Pulmonary:      Effort: Pulmonary effort is normal. No respiratory distress.      Breath sounds: Normal breath sounds.   Abdominal:      Palpations: Abdomen is soft.      Tenderness: There is no abdominal tenderness.   Musculoskeletal:      Right lower leg: No edema.      Left lower leg: No edema.   Skin:     General: Skin is warm and dry.   Neurological:      Mental Status: He is alert. Mental status is at baseline.          ASSESSMENT:   Diagnosis Orders   1. Type 2 diabetes mellitus with complication, without long-term current use of insulin ([facility])        2. Essential hypertension        3. CKD (chronic kidney disease) stage 4, GFR 15-29 ml/min ([facility])        4. Pubic ramus fracture, left, closed, initial encounter

## 2024-07-02 ENCOUNTER — OUTSIDE SERVICES (OUTPATIENT)
Dept: PRIMARY CARE CLINIC | Age: 84
End: 2024-07-02

## 2024-07-02 DIAGNOSIS — N18.4 CKD (CHRONIC KIDNEY DISEASE) STAGE 4, GFR 15-29 ML/MIN (HCC): ICD-10-CM

## 2024-07-02 DIAGNOSIS — S32.592A PUBIC RAMUS FRACTURE, LEFT, CLOSED, INITIAL ENCOUNTER (HCC): Primary | ICD-10-CM

## 2024-07-02 DIAGNOSIS — E11.8 TYPE 2 DIABETES MELLITUS WITH COMPLICATION, WITHOUT LONG-TERM CURRENT USE OF INSULIN (HCC): ICD-10-CM

## 2024-07-04 NOTE — PROGRESS NOTES
Jalen Guy is a 84 y.o. male being seen for his weekly follow up.  Location of visit: [facility]    HPI:  New today:  Pt currently sleeping.  No issues per nursing.         Review of Systems   Unable to perform ROS: Other (pt sleeping)          Physical Exam  Vitals reviewed.   Constitutional:       General: He is not in acute distress.  HENT:      Head: Normocephalic and atraumatic.      Nose: No congestion or rhinorrhea.   Eyes:      General:         Right eye: No discharge.         Left eye: No discharge.   Cardiovascular:      Rate and Rhythm: Normal rate and regular rhythm.   Pulmonary:      Effort: Pulmonary effort is normal. No respiratory distress.      Breath sounds: Normal breath sounds.   Abdominal:      Palpations: Abdomen is soft.      Tenderness: There is no abdominal tenderness.   Musculoskeletal:      Right lower leg: No edema.      Left lower leg: No edema.   Skin:     General: Skin is warm and dry.   Neurological:      Mental Status: He is alert. Mental status is at baseline.          ASSESSMENT:   Diagnosis Orders   1. Pubic ramus fracture, left, closed, initial encounter ([facility])        2. Type 2 diabetes mellitus with complication, without long-term current use of insulin ([facility])        3. CKD (chronic kidney disease) stage 4, GFR 15-29 ml/min ([facility])            PLAN:  1. Pubic ramus fracture, left, closed, initial encounter ([facility])  Assessment & Plan:  Currently sleeping, but no issues per nursing.  Pain controlled.  Doing okay with therapy.    2. Type 2 diabetes mellitus with complication, without long-term current use of insulin ([facility])  Assessment & Plan:  Monitor for signs of high or low BS   3. CKD (chronic kidney disease) stage 4, GFR 15-29 ml/min ([facility])  Assessment & Plan:  Monitor labs.  Have been stable

## 2024-07-09 ENCOUNTER — OUTSIDE SERVICES (OUTPATIENT)
Dept: PRIMARY CARE CLINIC | Age: 84
End: 2024-07-09

## 2024-07-09 DIAGNOSIS — N18.4 CKD (CHRONIC KIDNEY DISEASE) STAGE 4, GFR 15-29 ML/MIN (HCC): Primary | ICD-10-CM

## 2024-07-09 DIAGNOSIS — E11.8 TYPE 2 DIABETES MELLITUS WITH COMPLICATION, WITHOUT LONG-TERM CURRENT USE OF INSULIN (HCC): ICD-10-CM

## 2024-07-09 PROBLEM — N18.32 STAGE 3B CHRONIC KIDNEY DISEASE (HCC): Status: RESOLVED | Noted: 2020-11-06 | Resolved: 2024-07-09

## 2024-07-09 ASSESSMENT — ENCOUNTER SYMPTOMS
NAUSEA: 0
DIARRHEA: 0
VOMITING: 0
SHORTNESS OF BREATH: 0
COUGH: 0

## 2024-07-09 NOTE — PROGRESS NOTES
Jalen Guy is a 84 y.o. male being seen for his weekly follow up.  Location of visit: [facility]    HPI:  New today:  Pt denies any new issues or complaints.  Labs yesterday were worse with elevated BUN.         Review of Systems   Constitutional:  Negative for activity change, appetite change, chills, diaphoresis and fever.   HENT:  Negative for congestion.    Respiratory:  Negative for cough and shortness of breath.    Cardiovascular:  Negative for chest pain and palpitations.   Gastrointestinal:  Negative for diarrhea, nausea and vomiting.   Genitourinary:  Negative for hematuria.   Musculoskeletal:  Negative for arthralgias and myalgias.   Skin:  Negative for rash.   Neurological:  Negative for weakness and headaches.   Psychiatric/Behavioral:  Negative for agitation, dysphoric mood and sleep disturbance. The patient is not nervous/anxious.           Physical Exam  Vitals reviewed.   Constitutional:       General: He is not in acute distress.  HENT:      Head: Normocephalic and atraumatic.      Nose: No congestion or rhinorrhea.   Eyes:      General:         Right eye: No discharge.         Left eye: No discharge.   Cardiovascular:      Rate and Rhythm: Normal rate and regular rhythm.   Pulmonary:      Effort: Pulmonary effort is normal. No respiratory distress.      Breath sounds: Normal breath sounds.   Abdominal:      Palpations: Abdomen is soft.      Tenderness: There is no abdominal tenderness.   Musculoskeletal:      Right lower leg: No edema.      Left lower leg: No edema.   Skin:     General: Skin is warm and dry.   Neurological:      Mental Status: He is alert. Mental status is at baseline.          ASSESSMENT:   Diagnosis Orders   1. CKD (chronic kidney disease) stage 4, GFR 15-29 ml/min ([facility])        2. Type 2 diabetes mellitus with complication, without long-term current use of insulin ([facility])            PLAN:  1. CKD (chronic kidney disease) stage 4, GFR 15-29 ml/min ([facility])  Assessment &

## 2024-07-09 NOTE — ASSESSMENT & PLAN NOTE
BUN and Cr much worse yesterday. Recheck today is pending. Also ordered check of stool for OB since BUN is up so much.  Encouraged fluids    
  Monitor for signs of high or low sugars.    
Inguinal hernia repair with mesh

## 2024-07-19 ENCOUNTER — OUTSIDE SERVICES (OUTPATIENT)
Dept: PRIMARY CARE CLINIC | Age: 84
End: 2024-07-19

## 2024-07-19 DIAGNOSIS — S32.592D CLOSED FRACTURE OF RAMUS OF LEFT PUBIS WITH ROUTINE HEALING, SUBSEQUENT ENCOUNTER: ICD-10-CM

## 2024-07-19 DIAGNOSIS — I50.1 CONGESTIVE HEART FAILURE, CHRONIC, LEFT-SIDED (HCC): ICD-10-CM

## 2024-07-19 DIAGNOSIS — R31.9 URINARY TRACT INFECTION WITH HEMATURIA, SITE UNSPECIFIED: Primary | ICD-10-CM

## 2024-07-19 DIAGNOSIS — N39.0 URINARY TRACT INFECTION WITH HEMATURIA, SITE UNSPECIFIED: Primary | ICD-10-CM

## 2024-07-19 ASSESSMENT — ENCOUNTER SYMPTOMS
CONSTIPATION: 0
SHORTNESS OF BREATH: 0
SINUS PRESSURE: 0
SINUS PAIN: 0
BACK PAIN: 1
DIARRHEA: 0
COUGH: 0

## 2024-07-19 NOTE — PROGRESS NOTES
Jalen Guy is a 84 y.o. male being seen for his weekly follow up.  Location of visit: [facility]    Visit Date:  2024     Reason for Visit:    Chief Complaint   Patient presents with    Altered Mental Status        Altered Mental Status  Associated symptoms include weakness. Pertinent negatives include no chest pain, chills, congestion, coughing, fever, headaches or rash.      Resident has had increased confusion this week. He needs more direction this week. Urine culture appears positive and culture is pending.    He is participating with therapy. He does have some lower back and buttock pain.     Significant edema lower extremities. Some tenderness in legs.     Review of Systems   Constitutional:  Negative for chills and fever.   HENT:  Negative for congestion, sinus pressure and sinus pain.    Respiratory:  Negative for cough and shortness of breath.    Cardiovascular:  Positive for leg swelling. Negative for chest pain.   Gastrointestinal:  Negative for constipation and diarrhea.   Genitourinary:  Negative for difficulty urinating and dysuria.   Musculoskeletal:  Positive for back pain and gait problem.   Skin:  Negative for rash and wound.   Neurological:  Positive for weakness. Negative for headaches.   Psychiatric/Behavioral:  Positive for confusion.         Physical Exam  Vitals and nursing note reviewed.   Constitutional:       Appearance: Normal appearance.   HENT:      Head: Normocephalic and atraumatic.   Cardiovascular:      Rate and Rhythm: Normal rate and regular rhythm.      Heart sounds: Normal heart sounds.   Pulmonary:      Breath sounds: Normal breath sounds.   Abdominal:      General: Bowel sounds are normal.      Palpations: Abdomen is soft.   Musculoskeletal:      Right lower le+ Pitting Edema present.      Left lower le+ Pitting Edema present.   Neurological:      Mental Status: He is alert. He is disoriented.   Psychiatric:         Mood and Affect: Mood normal.

## 2024-07-23 ENCOUNTER — OUTSIDE SERVICES (OUTPATIENT)
Dept: PRIMARY CARE CLINIC | Age: 84
End: 2024-07-23

## 2024-07-23 DIAGNOSIS — E11.8 TYPE 2 DIABETES MELLITUS WITH COMPLICATION, WITHOUT LONG-TERM CURRENT USE OF INSULIN (HCC): ICD-10-CM

## 2024-07-23 DIAGNOSIS — N18.4 CKD (CHRONIC KIDNEY DISEASE) STAGE 4, GFR 15-29 ML/MIN (HCC): Primary | ICD-10-CM

## 2024-07-23 DIAGNOSIS — N30.00 ACUTE CYSTITIS WITHOUT HEMATURIA: ICD-10-CM

## 2024-07-23 DIAGNOSIS — I50.1 CONGESTIVE HEART FAILURE, CHRONIC, LEFT-SIDED (HCC): ICD-10-CM

## 2024-07-23 ASSESSMENT — ENCOUNTER SYMPTOMS
COUGH: 0
SHORTNESS OF BREATH: 0
NAUSEA: 0
VOMITING: 0
DIARRHEA: 0

## 2024-07-23 NOTE — ASSESSMENT & PLAN NOTE
Need to find balance in fluid status.  When lasix d/c'd due to kidney failure, then pt got SOB and edema. Lasix restarted. Monitor closely

## 2024-07-23 NOTE — PROGRESS NOTES
Jalen Guy is a 84 y.o. male being seen for his weekly follow up.  Location of visit: [facility]    HPI:  New today:  Pt doing okay.  No new complaints or issues.  ON abx for UTI.  Going to [place] soon.         Review of Systems   Constitutional:  Negative for activity change, appetite change, chills, diaphoresis and fever.   HENT:  Negative for congestion.    Respiratory:  Negative for cough and shortness of breath.    Cardiovascular:  Negative for chest pain and palpitations.   Gastrointestinal:  Negative for diarrhea, nausea and vomiting.   Genitourinary:  Negative for hematuria.   Musculoskeletal:  Negative for arthralgias and myalgias.   Skin:  Negative for rash.   Neurological:  Negative for weakness and headaches.   Psychiatric/Behavioral:  Negative for agitation, dysphoric mood and sleep disturbance. The patient is not nervous/anxious.           Physical Exam  Vitals reviewed.   Constitutional:       General: He is not in acute distress.  HENT:      Head: Normocephalic and atraumatic.      Nose: No congestion or rhinorrhea.   Eyes:      General:         Right eye: No discharge.         Left eye: No discharge.   Cardiovascular:      Rate and Rhythm: Normal rate and regular rhythm.   Pulmonary:      Effort: Pulmonary effort is normal. No respiratory distress.      Breath sounds: Normal breath sounds.   Abdominal:      Palpations: Abdomen is soft.      Tenderness: There is no abdominal tenderness.   Musculoskeletal:      Right lower leg: No edema.      Left lower leg: No edema.   Skin:     General: Skin is warm and dry.   Neurological:      Mental Status: He is alert. Mental status is at baseline.          ASSESSMENT:   Diagnosis Orders   1. CKD (chronic kidney disease) stage 4, GFR 15-29 ml/min ([facility])        2. Congestive heart failure, chronic, left-sided ([facility])        3. Type 2 diabetes mellitus with complication, without long-term current use of insulin ([facility])        4. Acute cystitis without

## 2024-07-23 NOTE — ASSESSMENT & PLAN NOTE
BUN, CR better but pt then in fluid overload d/t holding his lasix.  Lasix restarted   Patient has read the message sent regarding lab results.

## 2024-08-02 ENCOUNTER — OUTSIDE SERVICES (OUTPATIENT)
Dept: PRIMARY CARE CLINIC | Age: 84
End: 2024-08-02

## 2024-08-02 DIAGNOSIS — E11.8 TYPE 2 DIABETES MELLITUS WITH COMPLICATION, WITHOUT LONG-TERM CURRENT USE OF INSULIN (HCC): Primary | ICD-10-CM

## 2024-08-02 DIAGNOSIS — N18.4 CKD (CHRONIC KIDNEY DISEASE) STAGE 4, GFR 15-29 ML/MIN (HCC): ICD-10-CM

## 2024-08-02 DIAGNOSIS — I50.1 CONGESTIVE HEART FAILURE, CHRONIC, LEFT-SIDED (HCC): ICD-10-CM

## 2024-08-02 ASSESSMENT — ENCOUNTER SYMPTOMS
WHEEZING: 0
EYE REDNESS: 0
BACK PAIN: 1
NAUSEA: 0
SHORTNESS OF BREATH: 1
ABDOMINAL PAIN: 0
EYE DISCHARGE: 0
DIARRHEA: 0
VOMITING: 0
SORE THROAT: 0
COUGH: 0
RHINORRHEA: 0

## 2024-08-02 NOTE — PROGRESS NOTES
Conjunctivae normal.   Neck:      Thyroid: No thyromegaly.      Trachea: No tracheal deviation.   Cardiovascular:      Rate and Rhythm: Normal rate and regular rhythm.      Heart sounds: Normal heart sounds.   Pulmonary:      Effort: Pulmonary effort is normal. No respiratory distress.      Breath sounds: Normal breath sounds. No wheezing.   Abdominal:      General: There is no distension.      Palpations: There is mass.   Musculoskeletal:      Lumbar back: Decreased range of motion.      Right lower le+ Edema present.      Left lower le+ Edema present.   Lymphadenopathy:      Cervical: No cervical adenopathy.   Skin:     General: Skin is warm.      Findings: No rash.   Neurological:      Mental Status: He is alert and oriented to person, place, and time.   Psychiatric:         Mood and Affect: Mood normal.         Behavior: Behavior normal.         Thought Content: Thought content normal.          No Known Allergies     Past Medical History:   Diagnosis Date    Arthritis     CAD (coronary artery disease)     Cancer (HCC)     skin    CHF (congestive heart failure) (HCC)     Diabetes mellitus (HCC)     History of blood transfusion     Hyperlipidemia     Hypertension     Pacemaker         ASSESSMENT:  See matrix for vitals     Diagnosis Orders   1. Type 2 diabetes mellitus with complication, without long-term current use of insulin ([facility])        2. Congestive heart failure, chronic, left-sided (HCC)        3. CKD (chronic kidney disease) stage 4, GFR 15-29 ml/min ([facility])             Plan:  Hemoglobin A1c every 4 months  BMP every 4 months  Lipids CBC and hepatic function yearly

## 2024-10-08 ENCOUNTER — APPOINTMENT (OUTPATIENT)
Dept: GENERAL RADIOLOGY | Age: 84
DRG: 604 | End: 2024-10-08
Payer: MEDICARE

## 2024-10-08 ENCOUNTER — APPOINTMENT (OUTPATIENT)
Dept: CT IMAGING | Age: 84
DRG: 604 | End: 2024-10-08
Payer: MEDICARE

## 2024-10-08 ENCOUNTER — HOSPITAL ENCOUNTER (INPATIENT)
Age: 84
LOS: 1 days | DRG: 604 | End: 2024-10-08
Attending: EMERGENCY MEDICINE | Admitting: SURGERY
Payer: MEDICARE

## 2024-10-08 VITALS
HEART RATE: 115 BPM | RESPIRATION RATE: 84 BRPM | WEIGHT: 198.41 LBS | SYSTOLIC BLOOD PRESSURE: 133 MMHG | DIASTOLIC BLOOD PRESSURE: 76 MMHG | OXYGEN SATURATION: 99 % | TEMPERATURE: 88 F

## 2024-10-08 DIAGNOSIS — S01.01XA LACERATION OF SCALP, INITIAL ENCOUNTER: ICD-10-CM
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
## 2024-10-08 DIAGNOSIS — S72.141A CLOSED COMMINUTED INTERTROCHANTERIC FRACTURE OF RIGHT FEMUR, INITIAL ENCOUNTER (HCC): Primary | ICD-10-CM
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
## 2024-10-08 LAB
25(OH)D3 SERPL-MCNC: 43.3 NG/ML (ref 30–100)
ALLEN TEST: ABNORMAL
AMPHET UR QL SCN: NEGATIVE
ANION GAP SERPL CALCULATED.3IONS-SCNC: 14 MMOL/L (ref 9–16)
BARBITURATES UR QL SCN: NEGATIVE
BENZODIAZ UR QL: NEGATIVE
BILIRUB UR QL STRIP: NEGATIVE
BLOOD BANK SPECIMEN: ABNORMAL
BODY TEMPERATURE: 37
BUN BLD-MCNC: 67 MG/DL (ref 8–26)
BUN SERPL-MCNC: 68 MG/DL (ref 8–23)
CA-I BLD-SCNC: 1.14 MMOL/L (ref 1.15–1.33)
CANNABINOIDS UR QL SCN: NEGATIVE
CHLORIDE BLD-SCNC: 109 MMOL/L (ref 98–107)
CHLORIDE SERPL-SCNC: 107 MMOL/L (ref 98–107)
CK SERPL-CCNC: 53 U/L (ref 39–308)
CLARITY UR: CLEAR
CO2 BLD CALC-SCNC: 29 MMOL/L (ref 22–30)
CO2 SERPL-SCNC: 17 MMOL/L (ref 20–31)
COCAINE UR QL SCN: NEGATIVE
COHGB MFR BLD: 4.7 % (ref 0–5)
COLOR UR: YELLOW
COMMENT: NORMAL
CREAT SERPL-MCNC: 2.9 MG/DL (ref 0.7–1.2)
EGFR, POC: 17 ML/MIN/1.73M2
ERYTHROCYTE [DISTWIDTH] IN BLOOD BY AUTOMATED COUNT: 16.6 % (ref 11.8–14.4)
EST. AVERAGE GLUCOSE BLD GHB EST-MCNC: 143 MG/DL
ETHANOL PERCENT: <0.01 %
ETHANOLAMINE SERPL-MCNC: <10 MG/DL (ref 0–0.08)
FENTANYL UR QL: NEGATIVE
FIBRINOGEN, FUNCTIONAL TEG: 20 MM (ref 15–32)
FIO2 ON VENT: ABNORMAL %
FIO2: 100
FIO2: 100
GFR, ESTIMATED: 14 ML/MIN/1.73M2
GLUCOSE BLD-MCNC: 146 MG/DL (ref 74–100)
GLUCOSE BLD-MCNC: 216 MG/DL (ref 74–100)
GLUCOSE SERPL-MCNC: 149 MG/DL (ref 74–99)
GLUCOSE UR STRIP-MCNC: NEGATIVE MG/DL
HBA1C MFR BLD: 6.6 % (ref 4–6)
HCO3 VENOUS: 17.4 MMOL/L (ref 24–30)
HCT VFR BLD AUTO: 23 % (ref 41–53)
HCT VFR BLD AUTO: 24.2 % (ref 40.7–50.3)
HGB BLD-MCNC: 7.6 G/DL (ref 13–17)
HGB UR QL STRIP.AUTO: NEGATIVE
INR PPP: 2
KETONES UR STRIP-MCNC: NEGATIVE MG/DL
LEUKOCYTE ESTERASE UR QL STRIP: NEGATIVE
LY30 (LYSIS) TEG: 0 % (ref 0–2.6)
MA(MAX CLOT) RAPID TEG: 55 MM (ref 52–70)
MCH RBC QN AUTO: 31.5 PG (ref 25.2–33.5)
MCHC RBC AUTO-ENTMCNC: 31.4 G/DL (ref 28.4–34.8)
MCV RBC AUTO: 100.4 FL (ref 82.6–102.9)
METHADONE UR QL: NEGATIVE
MODE: ABNORMAL
MODE: ABNORMAL
MYOGLOBIN SERPL-MCNC: 211 NG/ML (ref 28–72)
NEGATIVE BASE EXCESS, ART: 7 MMOL/L (ref 0–2)
NEGATIVE BASE EXCESS, VEN: 8.7 MMOL/L (ref 0–2)
NITRITE UR QL STRIP: NEGATIVE
NRBC BLD-RTO: 0.5 PER 100 WBC
O2 DELIVERY DEVICE: ABNORMAL
O2 DELIVERY DEVICE: ABNORMAL
O2 SAT, VEN: 92.5 % (ref 60–85)
OPIATES UR QL SCN: NEGATIVE
OXYCODONE UR QL SCN: NEGATIVE
PARTIAL THROMBOPLASTIN TIME: 46.1 SEC (ref 23–36.5)
PATIENT TEMP: 31.2
PCO2 VENOUS: 40.7 MM HG (ref 39–55)
PCP UR QL SCN: NEGATIVE
PERFORMING LOCATION: ABNORMAL
PH UR STRIP: 5.5 [PH] (ref 5–8)
PH VENOUS: 7.25 (ref 7.32–7.42)
PLATELET # BLD AUTO: ABNORMAL K/UL (ref 138–453)
PLATELET, FLUORESCENCE: 64 K/UL (ref 138–453)
PLATELETS.RETICULATED NFR BLD AUTO: 3.3 % (ref 1.1–10.3)
PO2 VENOUS: 74.4 MM HG (ref 30–50)
POC ANION GAP: 8 MMOL/L (ref 7–16)
POC CREATININE: 2.5 MG/DL (ref 0.51–1.19)
POC HCO3: 19.4 MMOL/L (ref 21–28)
POC HCO3: 28.2 MMOL/L (ref 21–28)
POC HEMOGLOBIN (CALC): 7.8 G/DL (ref 13.5–17.5)
POC LACTIC ACID: 1.7 MMOL/L (ref 0.56–1.39)
POC LACTIC ACID: 1.8 MMOL/L (ref 0.56–1.39)
POC O2 SATURATION: 100 % (ref 94–98)
POC O2 SATURATION: 99.9 % (ref 94–98)
POC PCO2 TEMP: 33 MM HG
POC PCO2: 42.5 MM HG (ref 35–48)
POC PCO2: 55.7 MM HG (ref 35–48)
POC PH TEMP: 7.35
POC PH: 7.27 (ref 7.35–7.45)
POC PH: 7.31 (ref 7.35–7.45)
POC PO2 TEMP: 450.6 MM HG
POC PO2: 277.9 MM HG (ref 83–108)
POC PO2: 487.3 MM HG (ref 83–108)
POSITIVE BASE EXCESS, ART: 1.6 MMOL/L (ref 0–3)
POTASSIUM BLD-SCNC: 5.4 MMOL/L (ref 3.5–4.5)
POTASSIUM SERPL-SCNC: 4.5 MMOL/L (ref 3.7–5.3)
PROT UR STRIP-MCNC: NEGATIVE MG/DL
PROTHROMBIN TIME: 22.1 SEC (ref 11.7–14.9)
RBC # BLD AUTO: 2.41 M/UL (ref 4.21–5.77)
REACTION TIME TEG: 13 MIN (ref 4.6–9.1)
SAMPLE SITE: ABNORMAL
SAMPLE SITE: ABNORMAL
SODIUM BLD-SCNC: 145 MMOL/L (ref 138–146)
SODIUM SERPL-SCNC: 138 MMOL/L (ref 136–145)
SP GR UR STRIP: 1.01 (ref 1–1.03)
T4 FREE SERPL-MCNC: 1.2 NG/DL (ref 0.92–1.68)
TEST INFORMATION: NORMAL
TROPONIN I SERPL HS-MCNC: 32 NG/L (ref 0–22)
TROPONIN I SERPL HS-MCNC: 35 NG/L (ref 0–22)
TSH SERPL DL<=0.05 MIU/L-ACNC: 9.72 UIU/ML (ref 0.27–4.2)
UROBILINOGEN UR STRIP-ACNC: NORMAL EU/DL (ref 0–1)
VIT B12 SERPL-MCNC: >2000 PG/ML (ref 232–1245)
WBC OTHER # BLD: 4 K/UL (ref 3.5–11.3)

## 2024-10-08 PROCEDURE — 90715 TDAP VACCINE 7 YRS/> IM: CPT

## 2024-10-08 PROCEDURE — 99285 EMERGENCY DEPT VISIT HI MDM: CPT

## 2024-10-08 PROCEDURE — 6810039000 HC L1 TRAUMA ALERT

## 2024-10-08 PROCEDURE — 85610 PROTHROMBIN TIME: CPT

## 2024-10-08 PROCEDURE — 82803 BLOOD GASES ANY COMBINATION: CPT

## 2024-10-08 PROCEDURE — 71045 X-RAY EXAM CHEST 1 VIEW: CPT

## 2024-10-08 PROCEDURE — 6360000002 HC RX W HCPCS

## 2024-10-08 PROCEDURE — 84703 CHORIONIC GONADOTROPIN ASSAY: CPT

## 2024-10-08 PROCEDURE — 94640 AIRWAY INHALATION TREATMENT: CPT

## 2024-10-08 PROCEDURE — 5A1935Z RESPIRATORY VENTILATION, LESS THAN 24 CONSECUTIVE HOURS: ICD-10-PCS | Performed by: STUDENT IN AN ORGANIZED HEALTH CARE EDUCATION/TRAINING PROGRAM

## 2024-10-08 PROCEDURE — 85347 COAGULATION TIME ACTIVATED: CPT

## 2024-10-08 PROCEDURE — 86901 BLOOD TYPING SEROLOGIC RH(D): CPT

## 2024-10-08 PROCEDURE — 82607 VITAMIN B-12: CPT

## 2024-10-08 PROCEDURE — 85027 COMPLETE CBC AUTOMATED: CPT

## 2024-10-08 PROCEDURE — 83036 HEMOGLOBIN GLYCOSYLATED A1C: CPT

## 2024-10-08 PROCEDURE — 84520 ASSAY OF UREA NITROGEN: CPT

## 2024-10-08 PROCEDURE — 6360000004 HC RX CONTRAST MEDICATION

## 2024-10-08 PROCEDURE — 6360000002 HC RX W HCPCS: Performed by: STUDENT IN AN ORGANIZED HEALTH CARE EDUCATION/TRAINING PROGRAM

## 2024-10-08 PROCEDURE — 96365 THER/PROPH/DIAG IV INF INIT: CPT

## 2024-10-08 PROCEDURE — 72125 CT NECK SPINE W/O DYE: CPT

## 2024-10-08 PROCEDURE — G0480 DRUG TEST DEF 1-7 CLASSES: HCPCS

## 2024-10-08 PROCEDURE — 82550 ASSAY OF CK (CPK): CPT

## 2024-10-08 PROCEDURE — 83874 ASSAY OF MYOGLOBIN: CPT

## 2024-10-08 PROCEDURE — 37799 UNLISTED PX VASCULAR SURGERY: CPT

## 2024-10-08 PROCEDURE — 36620 INSERTION CATHETER ARTERY: CPT

## 2024-10-08 PROCEDURE — 85576 BLOOD PLATELET AGGREGATION: CPT

## 2024-10-08 PROCEDURE — 12002 RPR S/N/AX/GEN/TRNK2.6-7.5CM: CPT

## 2024-10-08 PROCEDURE — 90471 IMMUNIZATION ADMIN: CPT

## 2024-10-08 PROCEDURE — 82947 ASSAY GLUCOSE BLOOD QUANT: CPT

## 2024-10-08 PROCEDURE — 6370000000 HC RX 637 (ALT 250 FOR IP): Performed by: STUDENT IN AN ORGANIZED HEALTH CARE EDUCATION/TRAINING PROGRAM

## 2024-10-08 PROCEDURE — 93005 ELECTROCARDIOGRAM TRACING: CPT | Performed by: SURGERY

## 2024-10-08 PROCEDURE — 70496 CT ANGIOGRAPHY HEAD: CPT

## 2024-10-08 PROCEDURE — 36556 INSERT NON-TUNNEL CV CATH: CPT

## 2024-10-08 PROCEDURE — 85384 FIBRINOGEN ACTIVITY: CPT

## 2024-10-08 PROCEDURE — 85014 HEMATOCRIT: CPT

## 2024-10-08 PROCEDURE — 36430 TRANSFUSION BLD/BLD COMPNT: CPT

## 2024-10-08 PROCEDURE — 2500000003 HC RX 250 WO HCPCS

## 2024-10-08 PROCEDURE — 6360000004 HC RX CONTRAST MEDICATION: Performed by: STUDENT IN AN ORGANIZED HEALTH CARE EDUCATION/TRAINING PROGRAM

## 2024-10-08 PROCEDURE — 73552 X-RAY EXAM OF FEMUR 2/>: CPT

## 2024-10-08 PROCEDURE — 94002 VENT MGMT INPAT INIT DAY: CPT

## 2024-10-08 PROCEDURE — 96375 TX/PRO/DX INJ NEW DRUG ADDON: CPT

## 2024-10-08 PROCEDURE — 92950 HEART/LUNG RESUSCITATION CPR: CPT

## 2024-10-08 PROCEDURE — 03HY32Z INSERTION OF MONITORING DEVICE INTO UPPER ARTERY, PERCUTANEOUS APPROACH: ICD-10-PCS

## 2024-10-08 PROCEDURE — 02HV33Z INSERTION OF INFUSION DEVICE INTO SUPERIOR VENA CAVA, PERCUTANEOUS APPROACH: ICD-10-PCS

## 2024-10-08 PROCEDURE — 94761 N-INVAS EAR/PLS OXIMETRY MLT: CPT

## 2024-10-08 PROCEDURE — 82330 ASSAY OF CALCIUM: CPT

## 2024-10-08 PROCEDURE — 2500000003 HC RX 250 WO HCPCS: Performed by: STUDENT IN AN ORGANIZED HEALTH CARE EDUCATION/TRAINING PROGRAM

## 2024-10-08 PROCEDURE — 84443 ASSAY THYROID STIM HORMONE: CPT

## 2024-10-08 PROCEDURE — 73502 X-RAY EXAM HIP UNI 2-3 VIEWS: CPT

## 2024-10-08 PROCEDURE — 2700000000 HC OXYGEN THERAPY PER DAY

## 2024-10-08 PROCEDURE — 86927 PLASMA FRESH FROZEN: CPT

## 2024-10-08 PROCEDURE — 82805 BLOOD GASES W/O2 SATURATION: CPT

## 2024-10-08 PROCEDURE — 2000000000 HC ICU R&B

## 2024-10-08 PROCEDURE — 86900 BLOOD TYPING SEROLOGIC ABO: CPT

## 2024-10-08 PROCEDURE — P9017 PLASMA 1 DONOR FRZ W/IN 8 HR: HCPCS

## 2024-10-08 PROCEDURE — 86920 COMPATIBILITY TEST SPIN: CPT

## 2024-10-08 PROCEDURE — 0HQ0XZZ REPAIR SCALP SKIN, EXTERNAL APPROACH: ICD-10-PCS | Performed by: STUDENT IN AN ORGANIZED HEALTH CARE EDUCATION/TRAINING PROGRAM

## 2024-10-08 PROCEDURE — 70450 CT HEAD/BRAIN W/O DYE: CPT

## 2024-10-08 PROCEDURE — P9016 RBC LEUKOCYTES REDUCED: HCPCS

## 2024-10-08 PROCEDURE — 80051 ELECTROLYTE PANEL: CPT

## 2024-10-08 PROCEDURE — 85055 RETICULATED PLATELET ASSAY: CPT

## 2024-10-08 PROCEDURE — 85390 FIBRINOLYSINS SCREEN I&R: CPT

## 2024-10-08 PROCEDURE — 80307 DRUG TEST PRSMV CHEM ANLYZR: CPT

## 2024-10-08 PROCEDURE — 81003 URINALYSIS AUTO W/O SCOPE: CPT

## 2024-10-08 PROCEDURE — 71260 CT THORAX DX C+: CPT

## 2024-10-08 PROCEDURE — 96368 THER/DIAG CONCURRENT INF: CPT

## 2024-10-08 PROCEDURE — 84484 ASSAY OF TROPONIN QUANT: CPT

## 2024-10-08 PROCEDURE — 85730 THROMBOPLASTIN TIME PARTIAL: CPT

## 2024-10-08 PROCEDURE — 82306 VITAMIN D 25 HYDROXY: CPT

## 2024-10-08 PROCEDURE — 86850 RBC ANTIBODY SCREEN: CPT

## 2024-10-08 PROCEDURE — 84439 ASSAY OF FREE THYROXINE: CPT

## 2024-10-08 PROCEDURE — 83605 ASSAY OF LACTIC ACID: CPT

## 2024-10-08 PROCEDURE — 82565 ASSAY OF CREATININE: CPT

## 2024-10-08 RX ORDER — IOPAMIDOL 755 MG/ML
130 INJECTION, SOLUTION INTRAVASCULAR
Status: COMPLETED | OUTPATIENT
Start: 2024-10-08 | End: 2024-10-08

## 2024-10-08 RX ORDER — SODIUM CHLORIDE 9 MG/ML
INJECTION, SOLUTION INTRAVENOUS PRN
Status: DISCONTINUED | OUTPATIENT
Start: 2024-10-08 | End: 2024-10-08 | Stop reason: HOSPADM

## 2024-10-08 RX ORDER — POLYETHYLENE GLYCOL 3350 17 G/17G
17 POWDER, FOR SOLUTION ORAL DAILY
Status: DISCONTINUED | OUTPATIENT
Start: 2024-10-08 | End: 2024-10-08 | Stop reason: HOSPADM

## 2024-10-08 RX ORDER — ONDANSETRON 4 MG/1
4 TABLET, ORALLY DISINTEGRATING ORAL EVERY 8 HOURS PRN
Status: DISCONTINUED | OUTPATIENT
Start: 2024-10-08 | End: 2024-10-08 | Stop reason: HOSPADM

## 2024-10-08 RX ORDER — ONDANSETRON 2 MG/ML
4 INJECTION INTRAMUSCULAR; INTRAVENOUS EVERY 6 HOURS PRN
Status: DISCONTINUED | OUTPATIENT
Start: 2024-10-08 | End: 2024-10-08 | Stop reason: HOSPADM

## 2024-10-08 RX ORDER — SODIUM CHLORIDE 0.9 % (FLUSH) 0.9 %
10 SYRINGE (ML) INJECTION PRN
Status: DISCONTINUED | OUTPATIENT
Start: 2024-10-08 | End: 2024-10-08 | Stop reason: HOSPADM

## 2024-10-08 RX ORDER — IOPAMIDOL 755 MG/ML
75 INJECTION, SOLUTION INTRAVASCULAR
Status: COMPLETED | OUTPATIENT
Start: 2024-10-08 | End: 2024-10-08

## 2024-10-08 RX ORDER — PROPOFOL 10 MG/ML
INJECTION, EMULSION INTRAVENOUS
Status: COMPLETED
Start: 2024-10-08 | End: 2024-10-08

## 2024-10-08 RX ORDER — IPRATROPIUM BROMIDE AND ALBUTEROL SULFATE 2.5; .5 MG/3ML; MG/3ML
1 SOLUTION RESPIRATORY (INHALATION) EVERY 4 HOURS PRN
Status: DISCONTINUED | OUTPATIENT
Start: 2024-10-08 | End: 2024-10-08 | Stop reason: HOSPADM

## 2024-10-08 RX ORDER — NOREPINEPHRINE BITARTRATE 0.06 MG/ML
INJECTION, SOLUTION INTRAVENOUS
Status: DISCONTINUED
Start: 2024-10-08 | End: 2024-10-08 | Stop reason: HOSPADM

## 2024-10-08 RX ORDER — SODIUM CHLORIDE 0.9 % (FLUSH) 0.9 %
5-40 SYRINGE (ML) INJECTION EVERY 12 HOURS SCHEDULED
Status: DISCONTINUED | OUTPATIENT
Start: 2024-10-08 | End: 2024-10-08 | Stop reason: HOSPADM

## 2024-10-08 RX ORDER — PROPOFOL 10 MG/ML
5-50 INJECTION, EMULSION INTRAVENOUS CONTINUOUS
Status: DISCONTINUED | OUTPATIENT
Start: 2024-10-08 | End: 2024-10-08 | Stop reason: HOSPADM

## 2024-10-08 RX ORDER — NOREPINEPHRINE BITARTRATE 0.06 MG/ML
1-100 INJECTION, SOLUTION INTRAVENOUS CONTINUOUS
Status: DISCONTINUED | OUTPATIENT
Start: 2024-10-08 | End: 2024-10-08 | Stop reason: HOSPADM

## 2024-10-08 RX ADMIN — IPRATROPIUM BROMIDE AND ALBUTEROL SULFATE 1 DOSE: .5; 2.5 SOLUTION RESPIRATORY (INHALATION) at 06:46

## 2024-10-08 RX ADMIN — IOPAMIDOL 75 ML: 755 INJECTION, SOLUTION INTRAVENOUS at 06:31

## 2024-10-08 RX ADMIN — Medication 125 MCG/HR: at 07:56

## 2024-10-08 RX ADMIN — Medication 2000 MG: at 05:37

## 2024-10-08 RX ADMIN — NOREPINEPHRINE BITARTRATE 12 MCG/MIN: 0.06 INJECTION, SOLUTION INTRAVENOUS at 07:54

## 2024-10-08 RX ADMIN — PROPOFOL 10 MCG/KG/MIN: 10 INJECTION, EMULSION INTRAVENOUS at 05:28

## 2024-10-08 RX ADMIN — IOPAMIDOL 130 ML: 755 INJECTION, SOLUTION INTRAVENOUS at 06:32

## 2024-10-08 RX ADMIN — PROPOFOL 30 MCG/KG/MIN: 10 INJECTION, EMULSION INTRAVENOUS at 07:57

## 2024-10-08 RX ADMIN — TETANUS TOXOID, REDUCED DIPHTHERIA TOXOID AND ACELLULAR PERTUSSIS VACCINE, ADSORBED 0.5 ML: 5; 2.5; 8; 8; 2.5 SUSPENSION INTRAMUSCULAR at 05:37

## 2024-10-08 ASSESSMENT — LIFESTYLE VARIABLES
HOW OFTEN DO YOU HAVE A DRINK CONTAINING ALCOHOL: PATIENT UNABLE TO ANSWER
HOW MANY STANDARD DRINKS CONTAINING ALCOHOL DO YOU HAVE ON A TYPICAL DAY: PATIENT UNABLE TO ANSWER

## 2024-10-08 ASSESSMENT — PULMONARY FUNCTION TESTS
PIF_VALUE: 30
PIF_VALUE: 32

## 2024-10-08 NOTE — ED NOTES
Blood paused. Levo ordered per Dr. Bain and started at 5 by Andres RN, sedation restarted with fentanyl 50 mcg/hr and propofol at 10 mcg/min

## 2024-10-08 NOTE — CODE DOCUMENTATION
CODE BLUE called.  I went to bedside.  On arrival, patient had already received 2 rounds of epinephrine with the rhythm being PEA.  Patient had also received an amp of bicarb already prior to arrival.  Patient already was intubated with respiratory giving breaths.  CPR was in progress.  On review of labs, patient came in with a hemoglobin of 7.6 and he was found to have a right femoral neck fracture.  There was some swelling noted to the right inguinal region.  Given concern for possibly active bleeding given the hematoma and known femoral neck fracture, I did call for 2 units of blood.  Patient is on Eliquis and had received 1 unit of FFP in the trauma bay.  I asked for another unit of FFP.  Multiple rounds of epinephrine were given.  Rhythm remained PEA.  On bedside ultrasound, no cardiac activity noted.  Patient did receive calcium, additional bicarb, and was receiving blood and FFP through his central line and peripheral access.  Patient was brought up to the floor on 15 of levo but did increase to 30 of levo.  Dr. Genao, trauma surgeon ultimately ended up at bedside.  He requested for insulin and dextrose given concerns for possible hyperkalemia.  Glucose was 145 on EPOC.  10 units of subcu insulin with dextrose was given.  No significant abnormalities were seen on EPOC.  Family did come to bedside.  After 30 minutes of CPR with no change in rhythm and no cardiac activity seen on the monitor, decision was made with family to stop all efforts.  Time of death called at 1043.    Fernando Moise MD

## 2024-10-08 NOTE — FLOWSHEET NOTE
10/08/24 1000   Restraint Type   Non-Violent Restraint Type from Order question Soft Restraint Bilateral Wrist   Soft Restraint Bilateral Wrist DISCONTINUED     Restraints removed from pt upon arriving to rm 1020.

## 2024-10-08 NOTE — PROCEDURES
PROCEDURE NOTE - CENTRAL VENOUS LINE PLACEMENT    PATIENT NAME: Dq Trauma Xxistanbul  MEDICAL RECORD NO. [id number]  DATE: 10/8/2024  SURGEON: Dr Razo and Dr Foote  PRIMARY CARE PHYSICIAN: No primary care provider on file.    PREOPERATIVE DIAGNOSIS:  Need for IV access  POSTOPERATIVE DIAGNOSIS:  Same  PROCEDURE PERFORMED:  Right Femoral Vein Central Line Insertion  ANESTHESIA:  Local utilizing 1% lidocaine  ESTIMATED BLOOD LOSS:  Less than 25 ml  COMPLICATIONS:  None immediately appreciated.  OPERATIVE NOTE PREPARED BY: Carlos Razo DO  TIME OF PROCEDURE: 6:41 AM     DISCUSSION:  Stephan Dixon is a 89 y.o.-year-old male who requires additional IV access and central pressure monitoring. The history and physical examination were reviewed and confirmed.  Procedure was performed for emergency situation, patient is intubated and sedated.     PROCEDURE:  A timeout was initiated by the bedside nurse and was confirmed by those present.  The patient was placed in a supine position. The skin overlying the Right Femoral Vein was prepped with chlorhexadine and draped in sterile fashion. The skin was infiltrated with local anesthetic. Through the anesthetized region, the introducer needle was inserted into the femoral vein returning dark red non pulsatile blood. A guidewire was placed through the center of the needle with no resistance. A small incision made in the skin with a #11 scalpel blade. The dilator was inserted into the skin and vein over guidewire using Seldinger technique. The dilator was then removed and the catheter was placed in the vein over the guidewire using Seldinger technique. The guidewire was then removed and all ports aspirated and flushed appropriately. The catheter then secured using silk suture and a temporary sterile dressing was applied.  No immediate complication was evident.  All sponge, instrument and needle counts were correct at the completion of the procedure.       Carlos Razo  DO  10/8/24, 6:41 AM           Trauma Attending Attestation      I have reviewed the above [facility] note(s). I have seen and examined the pt.  I have discussed the findings, established the care plan and recommendations with Resident.      Sriram Foote,   10/20/2024  4:53 PM

## 2024-10-08 NOTE — PROCEDURES
PROCEDURE NOTE - LACERATION CLOSURE    PATIENT NAME: Dq Trauma Xxistanbul  MEDICAL RECORD NO. [id number]  DATE: 10/8/2024  SURGEON: Dr. Fooet  / Renée Bain MD  PRIMARY CARE PHYSICIAN: No primary care provider on file.    PREOPERATIVE DIAGNOSIS: Laceration(s) as follows:   LOCATION: right scalp   LENGTH: 4 cm   LAYERED CLOSURE: No    POSTOPERATIVE DIAGNOSIS:  Same  PROCEDURE PERFORMED:  Suture closure of laceration  ANESTHESIA:  none  ESTIMATED BLOOD LOSS:  Less than 25 ml.  COMPLICATIONS:  None immediately appreciated.  OPERATIVE NOTE PREPARED BY: Renée Bain MD     DISCUSSION:  Stephan Dixon is a 84 -year-old male. The history and physical examination were reviewed and confirmed.  The diagnoses, proposed procedure, risks, possible complications, benefits and alternatives were discussed with the patient or family. He was given the opportunity to ask questions, and once answered, informed consent was obtained.  The patient was then prepared for the procedure.    PROCEDURE:  A timeout was initiated and the procedure and patient were confirmed by those present.  The wound was explored with the following results arterial and venous bleeding.  The wound was repaired with 3-0 Prolene; 5 sutures were used.    No immediate complication was evident.  All sponge, instrument and needle counts were correct at the completion of the procedure.       Renée Bain MD  10/8/24, 7:24 AM

## 2024-10-08 NOTE — CONSULTS
Orthopaedic Surgery Consult  (Dr. Jose)    Time Evaluated: 0845    CC/Reason for consult: Right femoral neck fracture    HPI:      The patient is a 89 y.o. male who presents to the [place] emergency department as a trauma alert after being found down at his home.  Patient lives at [facility].  Patient was intubated and route by EMS due to decreased mentation and low GCS.  It was noted that the patient had multiple skin tears and priapism.  Per chart checking patient was moving all 4 extremities when they were not sedated in the trauma bay.  Per chart review pupils were pinpoint nonreactive.  CT scans showed a right femoral neck fracture with possible intertrochanteric extension.  Orthopedics was consulted at this time.    Upon orthopedic evaluation, patient was seen intubated and sedated with a c-collar in place.  The above history was obtained from chart checking.  Patient was seen with multiple lacerations on his body as well as line and saturated bedding blood from a laceration.    Per chart review patient is on Eliquis. Per chart review patient has a past orthopedic history of T11, T12, L1 kyphoplasty by Dr. Adan in 2020.  Per chart review patient has a past medical history of CAD, cancer, CHF, diabetes, hyperlipidemia, hypertension, pacemaker placement, and cardiac catheterization.      Past Medical History:    No past medical history on file.  Past Surgical History:    No past surgical history on file.  Medications Prior to Admission:   Prior to Admission medications    Not on File     Allergies:    Patient has no allergy information on record.  Social History:   Social History     Socioeconomic History    Marital status:      Family History:  No family history on file.    ROS:   ROS unable to be obtained due to patient's intubated and sedated status.    PE:  Blood pressure 113/76, pulse 61, temperature (!) 88 °F (31.1 °C), resp. rate 14, weight 90 kg (198 lb 6.6 oz), SpO2

## 2024-10-08 NOTE — ED PROVIDER NOTES
[facility]  Emergency Department  Emergency Medicine Resident Turn-Over     Note Started: 7:37 AM EDT    Care of Jalen Guy was assumed from Dr. Moon and is being seen for Fall  .  The patient's initial evaluation and plan have been discussed with the prior provider who initially evaluated the patient.     EMERGENCY DEPARTMENT COURSE / MEDICAL DECISION MAKING:       MEDICATIONS GIVEN:  Orders Placed This Encounter   Medications    propofol 1000 MG/100ML infusion     Emerson Ba: cabinet override    DISCONTD: fentaNYL (SUBLIMAZE) 50 MCG/ML infusion     Emerson Ba: cabinet override    Tetanus-Diphth-Acell Pertussis (BOOSTRIX) injection 0.5 mL    ceFAZolin (ANCEF) 2000 mg in sterile water 20 mL IV syringe     Order Specific Question:   Antimicrobial Indications     Answer:   Other     Order Specific Question:   Other Abx Indication     Answer:   trauma ppx    Tetanus-Diphth-Acell Pertussis (BOOSTRIX) 5-2.5-18.5 LF-MCG/0.5 injection     Demetris Gentile: cabinet override    ceFAZolin 2000 mg in 20 mL SWFI IV Syringe IV syringe     Demetris Gentile: cabinet override    iopamidol (ISOVUE-370) 76 % injection 130 mL    DISCONTD: ipratropium 0.5 mg-albuterol 2.5 mg (DUONEB) nebulizer solution 1 Dose     Order Specific Question:   Initiate RT Bronchodilator Protocol     Answer:   Yes - Inpatient Protocol    DISCONTD: norepinephrine-sodium chloride (LEVOPHED) 16-0.9 MG/250ML-% infusion     Emerson Ba: cabinet override    iopamidol (ISOVUE-370) 76 % injection 75 mL    DISCONTD: 0.9 % sodium chloride infusion    DISCONTD: norepinephrine (LEVOPHED) 16 mg in sodium chloride 0.9 % 250 mL infusion     Order Specific Question:   Titrate Infusion?     Answer:   Yes     Order Specific Question:   Initial Infusion Dose:     Answer:   5 mcg/min     Order Specific Question:   Goal of Therapy is:     Answer:   MAP greater than 65 mmHg     Order Specific Question:   Contact Provider if:     Answer:   Patient is

## 2024-10-08 NOTE — ED NOTES
Per Dr. Bain pt placed in soft bilat wrist restraints due to pt pulling and tugging at lines and to protect airway

## 2024-10-08 NOTE — PROGRESS NOTES
[place] [facility] [place]    PATIENT NAME: Dq Trauma Xxistanbul    Shift date: 10/07/2024  Shift day: Monday   Shift # 3    Room # TRAUMA A Name: Jalen Guy (: 1940)           Age: 84 y.o.  Gender: male          Anabaptism: No Holiness on file   Place of Pentecostal: Unknown    Trauma/Incident type: Adult Trauma Alert  Admit Date & Time: 10/8/2024  5:12 AM  TRAUMA NAME: XXALEXYS    ADVANCE DIRECTIVES IN CHART?  No    NAME OF DECISION MAKER: Unknown    RELATIONSHIP OF DECISION MAKER TO PATIENT: Unknown    PATIENT/EVENT DESCRIPTION:  Jalen Guy is an 84 y.o. male who arrived to TRAUMA A and was paged out as an \"Adult Trauma Alert\" due to \"Being Found Down.\" Per report, patient \"fell\" at his facility, [facility]. Patient was \"intubated\" en route by EMS, due to \"decreased mentation,\" per report. Pt to be admitted to .      SPIRITUAL ASSESSMENT-INTERVENTION-OUTCOME:   responded to page and gathered patient information in room.  contacted patient's facility and learned that patient resides with spouse at facility. Per facility, they attempted to reach patient's daughter, Carlotta, with no success.  also made attempts to reach patient's daughter and left voicemails for her.  attempted call again near end of shift and reached patient's son-in-law.  gathered patient's daughter's cell phone and reached her by phone.  informed her of patient's arrival to the ED.  shared patient's daughter's information with trauma team, with request to update family as soon as possible.      PATIENT BELONGINGS:  No belongings noted    ANY BELONGINGS OF SIGNIFICANT VALUE NOTED:  N/A    REGISTRATION STAFF NOTIFIED?  Yes      WHAT IS YOUR SPIRITUAL CARE PLAN FOR THIS PATIENT?:  Chaplains can make follow-up visit, per request. Chaplains can be reached  via [employer]Spot[employer].    Electronically signed by Chaplain Lyla, on

## 2024-10-08 NOTE — ED PROVIDER NOTES
[facility]     Emergency Department     Faculty Attestation    I performed a history and physical examination of the patient and discussed management with the resident. I have reviewed and agree with the resident’s findings including all diagnostic interpretations, and treatment plans as written. Any areas of disagreement are noted on the chart. I was personally present for the key portions of any procedures. I have documented in the chart those procedures where I was not present during the key portions. I have reviewed the emergency nurses triage note. I agree with the chief complaint, past medical history, past surgical history, allergies, medications, social and family history as documented unless otherwise noted below. Documentation of the HPI, Physical Exam and Medical Decision Making performed by [email or web address] is based on my personal performance of the HPI, PE and MDM. For Physician Assistant/ Nurse Practitioner cases/documentation I have personally evaluated this patient and have completed at least one if not all key elements of the E/M (history, physical exam, and MDM). Additional findings are as noted.    Note Started: 5:39 AM EDT     Adult male trauma s/p fall, found down unknown duration, intubated per ems,   PE 6.5 ett / R parietal scalp lac with skull defect,  JEANA 2mm, trachea midline, collar in place, no cervical tenderness or deformity, abrasion anterior chest, no sub q air,     Trauma directing care, care turned over to day shift    EKG Interpretation    Interpreted by me      CRITICAL CARE: There was a high probability of clinically significant/life threatening deterioration in this patient's condition which required my urgent intervention.  Total critical care time was 10 minutes.  This excludes any time for separately reportable procedures.       Joce Carpenter,   10/08/24 0540       Joce Childers DO  10/08/24

## 2024-10-08 NOTE — ED PROVIDER NOTES
[facility] ED  Emergency Department Encounter  Emergency Medicine Resident     Pt Name:Stephan Dixon  MRN: [id number]  Birthdate 1/1/1882  Date of evaluation: 10/8/24  PCP:  No primary care provider on file.  Note Started: 6:33 AM EDT      CHIEF COMPLAINT       Chief Complaint   Patient presents with    Fall       HISTORY OF PRESENT ILLNESS  (Location/Symptom, Timing/Onset, Context/Setting, Quality, Duration, Modifying Factors, Severity.)      Stephan Dixon is a 89 y.o. male on Eliquis who presents to the emergency department as a trauma alert after being found down at her skilled nursing facility.  Unclear how long patient was down for.  Patient was intubated with 6.5 ET tube by EMS with etomidate for airway protection as she was noted to have a severely decreased GCS.  No paralytic.  Patient was noted to have priapism on EMS arrival.  Head laceration with bleeding specifically right side temporal/parietal region and posterior scalp.  E-FAST negative in the trauma bay.  Arrives with c-collar in place.  Immediately met by trauma surgery team in the trauma bay.  Initially sedated with propofol in the trauma bay with decreased of blood pressure.  Sedation changed to fentanyl drip.  Moving all 4 extremities without appropriate sedation.    PAST MEDICAL / SURGICAL / SOCIAL / FAMILY HISTORY      has no past medical history on file.     has no past surgical history on file.      Social History     Socioeconomic History    Marital status: Not on file     Spouse name: Not on file    Number of children: Not on file    Years of education: Not on file    Highest education level: Not on file   Occupational History    Not on file   Tobacco Use    Smoking status: Not on file    Smokeless tobacco: Not on file   Substance and Sexual Activity    Alcohol use: Not on file    Drug use: Not on file    Sexual activity: Not on file   Other Topics Concern    Not on file   Social History Narrative    Not  on file     Social Determinants of Health     Financial Resource Strain: Not on file   Food Insecurity: Not on file   Transportation Needs: Not on file   Physical Activity: Not on file   Stress: Not on file   Social Connections: Not on file   Intimate Partner Violence: Not on file   Housing Stability: Not on file       No family history on file.    Allergies:  Patient has no allergy information on record.    Home Medications:  Prior to Admission medications    Not on File       REVIEW OF SYSTEMS       Review of Systems   Reason unable to perform ROS: Intubated and sedated.       PHYSICAL EXAM      INITIAL VITALS:   /84   Pulse 61   Temp (!) 88 °F (31.1 °C)   Resp 14   Wt 90 kg (198 lb 6.6 oz)   SpO2 100%     Physical Exam  Constitutional:       Comments: Elderly critically ill appearing male intubated sedated on mechanical ventilator   HENT:      Head: Normocephalic.      Comments: Right temporal/parietal superficial scalp abrasion with bleeding controlled.  Posterior scalp laceration with bleeding controlled     Right Ear: Tympanic membrane, ear canal and external ear normal.      Left Ear: Tympanic membrane, ear canal and external ear normal.      Nose:      Comments: No epistaxis or septal hematoma     Mouth/Throat:      Mouth: Mucous membranes are moist.      Comments: ET tube in place.  OG tube in place  Eyes:      General:         Right eye: No discharge.         Left eye: No discharge.      Comments: Pupils 2 mm bilaterally however not reactive to light   Neck:      Comments: C-collar in place  Cardiovascular:      Rate and Rhythm: Normal rate and regular rhythm.      Pulses: Normal pulses.      Heart sounds: Normal heart sounds.      Comments: Paced rhythm  Pulmonary:      Breath sounds: No wheezing.      Comments: Coarse ventilator breath sounds bilaterally  Abdominal:      General: Abdomen is flat. There is no distension.   Musculoskeletal:         General: No deformity.   Skin:     Findings: No

## 2024-10-08 NOTE — ED PROVIDER NOTES
[facility]  FACULTY HANDOFF     7:30 AM EDT  Handoff taken on the following patient from prior Attending Physician:  Pt Name: Dq Trauma Xxistanbul  PCP:  No primary care provider on file.    Attestation  I was available and discussed any additional care issues that arose and coordinated the management plans with the resident(s) caring for the patient during my duty period. Any areas of disagreement with resident's documentation of care or procedures are noted on the chart. I was personally present for the key portions of any/all procedures during my duty period. I have documented in the chart those procedures where I was not present during the key portions.         CHIEF COMPLAINT       Chief Complaint   Patient presents with    Fall         CURRENT MEDICATIONS     Previous Medications  Previous Medications    No medications on file       Encounter Medications  Orders Placed This Encounter   Medications    propofol 1000 MG/100ML infusion     Emerson Ba: cabinet override    fentaNYL (SUBLIMAZE) 50 MCG/ML infusion     Emerson Ba: cabinet override    Tetanus-Diphth-Acell Pertussis (BOOSTRIX) injection 0.5 mL    ceFAZolin (ANCEF) 2000 mg in sterile water 20 mL IV syringe     Order Specific Question:   Antimicrobial Indications     Answer:   Other     Order Specific Question:   Other Abx Indication     Answer:   trauma ppx    Tetanus-Diphth-Acell Pertussis (BOOSTRIX) 5-2.5-18.5 LF-MCG/0.5 injection     Demetris Gentile: cabinet override    ceFAZolin 2000 mg in 20 mL SWFI IV Syringe IV syringe     Demetris Gentile: cabinet override    iopamidol (ISOVUE-370) 76 % injection 130 mL    ipratropium 0.5 mg-albuterol 2.5 mg (DUONEB) nebulizer solution 1 Dose     Order Specific Question:   Initiate RT Bronchodilator Protocol     Answer:   Yes - Inpatient Protocol    norepinephrine-sodium chloride (LEVOPHED) 16-0.9 MG/250ML-% infusion     Emerson Ba: cabinet override    iopamidol (ISOVUE-370) 76

## 2024-10-08 NOTE — PROGRESS NOTES
SPIRITUAL HEALTH  [facility]   Patient Death Note    DEATH   Shift date: 10.8.2024    Shift day: Tuesday  Shift #: 1                 Room # 1020/1020-01   Name: Jalen Guy            Age: 89 y.o.  Gender: male          Uatsdin: Orthodoxy      Place of Lutheran: [facility]    Admit Date & Time: 10/8/2024  5:12 AM     Referral: Nurse  Actual date of death: 10.8.2024   TOD: 1043       SITUATION AT DEATH:  Patient's primary condition was Fall at nursing home, initial encounter and passed due to a cardiac arrest.     IS THIS A 'S CASE?  Yes    NAME AND PHONE NUMBER OF DOCTOR SIGNING DEATH CERTIFICATE: \" - [phone]     Copy of COMPLETED Release of Body Form Received?  Yes    Patient's belongings: Unknown      SPIRITUAL HEALTH INTERVENTION:   provided Provided space for feelings, thoughts, and concerns, Actively listened, Listened empathetically, Acknowledged current situation, Acknowledged difficult experiences, Asked guided questions, Cultivated a relationship of care and support, Provided crisis intervention, Explored relational needs and resources, Identified supportive relationships, Determined support to be available, Explored emotional needs and resources, Clarified, confirmed, or reviewed info from treatment teams, Coordinated support with Nurse, Coordinated support with Physicians , Escorted family, Provided grief blanket, Provided hospitality, and Provided grief support.     Family Received Grief Packet?  Yes     HOME:  Name:  None    City: Unknown   Phone Number: Unknown     EMERGENCY CONTACTS:  Extended Emergency Contact Information  Primary Emergency Contact: Carlotta Mehta  Home Phone: [phone]  Relation: Child  Secondary Emergency Contact: Christen Jacobs  Home Phone: [phone]  Relation: Child     NEXT OF KIN:  Name:  Aidee Mehta  Relationship:Children   Street Address:  [address]  City:  [place]  State: [place]  Zip code:   [id number]  Phone Number:  [phone]    ANY FOLLOW-UP NEEDED?  No      IF SO, WHAT?  None .    Electronically signed by JUAN Calderon on 10/8/2024 at 1:04 PM  [facility]  [facility]  [phone]     10/08/24 1043   Encounter Summary   Encounter Overview/Reason Grief, Loss, and Adjustments   Service Provided For Patient;Family   Referral/Consult From Nurse   Support System Children;Spouse   Last Encounter  10/08/24   Complexity of Encounter High   Begin Time 1043   End Time  1145   Total Time Calculated 62 min   Grief, Loss, and Adjustments   Type Death   Assessment/Intervention/Outcome   Assessment Calm;Complicated grieving;Coping;Sad;Tearful   Intervention Active listening;Discussed illness injury and it’s impact;Explored/Affirmed feelings, thoughts, concerns;Explored Coping Skills/Resources;Sustaining Presence/Ministry of presence;Prayer (assurance of)/[place];Grief Care   Outcome Engaged in conversation;Expressed feelings, needs, and concerns;Coping     Electronically signed by Alfredo Singh on 10/8/2024 at 1:04 PM

## 2024-10-08 NOTE — PROGRESS NOTES
Charting intubations and reintubations    ETT Size 6.5  ETT Placement 23 cm at lips  ETT secured with Anamaria    Tube placement verified by:  Auscultation yes  End tidal waveforms  CXR yes  Intubated by EMS prior to arrival     Rodney Snell RCP  7:21 AM

## 2024-10-08 NOTE — PROCEDURES
Arterial Line Placement Procedure Note    DATE: 10/8/2024  TIME OF PROCEDURE: 0630  RE: Dq Trauma Xxistanbul   1/1/1882    PREOPERATIVE DIAGNOSIS:  Hypotension    POSTOPERATIVE DIAGNOSIS:  Hypotension    INDICATION:  Need or invasive blood pressure monitoring.     OPERATION PERFORMED:  Placement of a 20 Gauge  Arterial line in Left radial artery*    Performed by: Trudi Gray DO     ASSISTANT(S):      ANESTHESIA:  None    Procedure: Sterile technique was initiated (hand washing, gown, cap, mask, gloves, draping) before the skin over the left radial artery was prepped with Chloraprep. After skin infiltration with 1% plain lidocaine, the vessel was identified with a 20 Ga. introducer needle and a guide wire was placed without difficulty. Then, a 20 Ga. catheter was easily threaded.  Good waveform obtained on monitor and line withdrew and flushed well.  A-line was secured with skin sutures, and dressed.    Complications: none    Trudi Gray DO  7:10 AM

## 2024-10-08 NOTE — H&P
TRAUMA H&P/CONSULT    PATIENT NAME: Dq Trauma Xxistanbul  YOB: 1882  MEDICAL RECORD NO. [id number]   DATE: 10/8/2024  PRIMARY CARE PHYSICIAN: No primary care provider on file.  PATIENT EVALUATED AT THE REQUEST OF : Liseth    ACTIVATION   Trauma Alert      IMPRESSION AND PLAN:     # Found down    - Follow up imaging and labs  - Admit to TICU for close monitoring  - Ventilator management as appropriate    CONSULT SERVICES  N/A      HISTORY:     Chief Complaint:  Found down    GENERAL DATA  Patient information was obtained from EMS personnel.  History/Exam limitations: acuity of illness.  Injury Date: today   Approximate Injury Time: just PTA        Transport mode: Ambulance  Referring Hospital: N/A    SETTING OF TRAUMATIC EVENT   Location : Home  Specific Details of Location: unknown    MECHANISM OF INJURY    Found down      HISTORY:     Stephan Dixon is a male that presented to the Emergency Department after being found down. Nursing staff at patient's SNF found him on the ground and called EMS. Upon EMS arrival, patient had sonorous respirations and a pulse. He was intubated en route with Etomidate for airway protection given decreased GCS. No paralytic was given. EMS noted that patient had multiple skin tears and a priapism. He is anticoagulated with Eliquis. No other history provided.     Traumatic loss of Consciousness: Unknown    Total Fluids Given Prior To Arrival: unknown    MEDICATIONS:   []  None     [x]  Information not available due to exam limitations documented above    Prior to Admission medications    Not on File       ALLERGIES:   []  None    [x]   Information not available due to exam limitations documented above     Patient has no allergy information on record.    PAST MEDICAL/SURGICAL HISTORY: []  None   [x]   Information not available due to exam limitations documented above      has no past medical history on file.  has no past surgical history on file.    FAMILY HISTORY

## 2024-10-08 NOTE — PROGRESS NOTES
Spiritual Health History and Assessment/Progress Note  [facility]    (P) Follow-up, (P) Follow up,  ,      Name: Jalen Guy MRN: [id number]    Age: 89 y.o.     Sex: male   Language: English   Orthodoxy: Orthodox   Fall at nursing home, initial encounter     Date: 10/8/2024            Total Time Calculated: (P) 15 min              Spiritual Assessment began in [facility]        Referral/Consult From: (P) Other    Encounter Overview/Reason: (P) Follow-up  Service Provided For: (P) Family    Maricruz, Belief, Meaning:   Patient is connected with a maricruz tradition or spiritual practice  Family/Friends are connected with a maricruz tradition or spiritual practice      Importance and Influence:  Patient unable to assess at this time  Family/Friends have no beliefs influential to healthcare decision-making identified during this visit    Community:  Patient is connected with a spiritual community  Family/Friends are connected with a spiritual community:    Assessment and Plan of Care:     Patient Interventions include: Facilitated expression of thoughts and feelings and Affirmed coping skills/support systems  Family/Friends Interventions include: Facilitated expression of thoughts and feelings and Explored spiritual coping/struggle/distress    Patient Plan of Care: Spiritual Care available upon further referral  Family/Friends Plan of Care: Spiritual Care available upon further referral    Electronically signed by JUAN Calderon on 10/8/2024 at 1:02 PM     10/08/24 1010   Encounter Summary   Encounter Overview/Reason Follow-up   Service Provided For Family   Referral/Consult From Other    Support System Children   Last Encounter  10/08/24   Complexity of Encounter Moderate   Begin Time 1010   End Time  1025   Total Time Calculated 15 min   Crisis   Type Follow up   Assessment/Intervention/Outcome   Assessment Calm;Coping;Sad   Intervention Active listening;Discussed illness

## 2024-10-08 NOTE — PLAN OF CARE
Restraint type: Soft restraint:  right wrist and left wrist  Reason for restraints: Pulling out devices:  Involuntary/Voluntary movements to harm self  Duration: 24 hours    Restraints must be removed when an alternative is available and effective and/or patient no longer meets criteria.    Orders must be renewed every calendar day or when discontinued.    The MD must conduct a face to face assessment within 1 calendar day of initiation when initial restraint order is verbal.     ARNULFO Ching MD  PGY-1 Emergency Medicine  [facility]   10/8/2024, 7:50 AM

## 2024-10-09 LAB
ABO/RH: NORMAL
ANTIBODY SCREEN: NEGATIVE
ARM BAND NUMBER: NORMAL
BLOOD BANK BLOOD PRODUCT EXPIRATION DATE: NORMAL
BLOOD BANK DISPENSE STATUS: NORMAL
BLOOD BANK ISBT PRODUCT BLOOD TYPE: 7300
BLOOD BANK ISBT PRODUCT BLOOD TYPE: 9500
BLOOD BANK PRODUCT CODE: NORMAL
BLOOD BANK SAMPLE EXPIRATION: NORMAL
BLOOD BANK UNIT TYPE AND RH: NORMAL
BPU ID: NORMAL
COMPONENT: NORMAL
CROSSMATCH RESULT: NORMAL
EKG ATRIAL RATE: 27 BPM
EKG Q-T INTERVAL: 570 MS
EKG QRS DURATION: 172 MS
EKG QTC CALCULATION (BAZETT): 570 MS
EKG R AXIS: 141 DEGREES
EKG T AXIS: 48 DEGREES
EKG VENTRICULAR RATE: 60 BPM
TRANSFUSION STATUS: NORMAL
UNIT DIVISION: 0
UNIT ISSUE DATE/TIME: NORMAL

## 2024-10-09 PROCEDURE — 93010 ELECTROCARDIOGRAM REPORT: CPT | Performed by: INTERNAL MEDICINE

## 2024-10-14 NOTE — DISCHARGE SUMMARY
DISCHARGE SUMMARY:    PATIENT NAME:  Jalen Guy  YOB: 1940  MEDICAL RECORD NO. [id number]  DATE: 10/14/24  PRIMARY CARE PHYSICIAN: Ree Leigh MD  ADMIT DATE:  10/8/2024    DISCHARGE DATE:  10/8/2024  DISPOSITION:  death  ADMITTING DIAGNOSIS:   Found down, hip fracture        CONSULTANTS:  orthopedics, vascular    PROCEDURES:   none    HOSPITAL COURSE:   Jalen Guy is a 84 y.o. male who was admitted on 10/8/2024  Hospital Course:  Found down in his home (assisted living) approx 0530 , intubated by EMS for ? AMS, noted r parietal scalp lac.  Ed arrival. Placed on prop and fent.  Temp 88.3F, placed on aleks hugger 0720, root. CT scan with r IT fx.  Scalp closure 0752.  Awaiting admit TICU. CVC, a line placd in ED, lac closed ed.  Once in ticu, was noted to still be hypothermic, on small dose NE, non responsive.  He went asystole, multiple rounds of CPR (total of 40 minute) were attempted without ROSC.  Wife arrived bedside and she and daughters present for discussion of stopping resuscitative efforts        DISPOSITION:     Follow-up:  No follow-up provider specified.      SIGNED:  RAVINDRA CAMACHO CNP   10/14/2024, 10:00 AM  Time Spent for discharge: 35 minutes

## 2024-10-15 NOTE — PROGRESS NOTES
Physician Progress Note      PATIENT:               ARI SAHNI  CSN #:                  [id number]  :                       1882  ADMIT DATE:       10/8/2024 5:12 AM  DISCH DATE:        10/8/2024 10:43 AM  RESPONDING  PROVIDER #:        Katherine Crews CNP          QUERY TEXT:    Pt admitted with scalp hematoma with lacerations as found down with sonorous   respirations. Pt noted to have been intubated per EMS given decreased GCS.  If possible, please document in the progress notes and discharge summary if   you are evaluating and/or treating any of the following:    The medical record reflects the following:  Risk Factors: 84 yr old found down unresponsive  Clinical Indicators: vbg 7.253 Co2 40.7 be- 8.7 HCO3 17.4  Upon EMS arrival, patient had sonorous respirations and a pulse. He was   intubated en route with Etomidate for airway protection given decreased GCS.   No paralytic was given. EMS noted that patient had multiple skin tears and a   priapism.  CT chest abdomen: Multilobar aspiration pneumonia particularly in the right   lung. Additional ground-glass and nodular consolidations likely infectious or  inflammatory etiology.  Treatment: IV Ancef, Intubation, Mechanical ventilator, lab monitoring,   imaging    Thank you in advance for your time. Please reach out for any questions.  Jessie Adame RN, CCDS, CRCR Supervisor [phone]  Options provided:  -- Acute respiratory failure with hypoxia  -- Acute respiratory failure with hypercapnia  -- Acute respiratory failure with hypoxia and hypercapnia  -- Airway protection without respiratory failure  -- Other - I will add my own diagnosis  -- Disagree - Not applicable / Not valid  -- Disagree - Clinically unable to determine / Unknown  -- Refer to Clinical Documentation Reviewer    PROVIDER RESPONSE TEXT:    This patient is in acute respiratory failure with hypoxia.    Query created by: Jessie Adame on 10/15/2024 9:50 AM      QUERY TEXT:    Pt admitted

## 2024-10-20 PROBLEM — S72.141A CLOSED COMMINUTED INTERTROCHANTERIC FRACTURE OF PROXIMAL END OF RIGHT FEMUR (HCC): Status: ACTIVE | Noted: 2024-10-20

## 2024-10-24 NOTE — PROGRESS NOTES
Physician Progress Note      PATIENT:               ARI SAHNI  CSN #:                  [id number]  :                       [id number]  ADMIT DATE:       10/8/2024 5:12 AM  DISCH DATE:        10/8/2024 10:43 AM  RESPONDING  PROVIDER #:        Katherine Crews CNP          QUERY TEXT:    Pt admitted with scalp hematoma with lacerations as found down with sonorous   respirations. Pt noted to have lab values of K 5.4 Cr 2.9>2.5 bun 68>67.  If possible, please document in the progress notes and discharge summary if   you are evaluating and/or treating any of the following:    The medical record reflects the following:  Risk Factors: 84 yr old found down unresponsive  Clinical Indicators: K 5.4 Cr 2.9>2.5 bun 68>67  vbg 7.253 Co2 40.7 be- 8.7 HCO3 17.4, Code blue not  Glucose was 145 on EPOC.    10 units of subcu insulin with dextrose was given.  Treatment: lab monitoring, IVF, insulin/dextrose    Thank you in advance for your time. Please reach out for any questions.  Jessie Adame RN, CCDS, CRCR Supervisor [phone]    Defined by Kidney Disease Improving Global Outcomes (KDIGO) clinical practice   guideline for acute kidney injury:  -Increase in SCr by greater than or equal to 0.3 mg/dl within 48 hours; or  -Increase or decrease in SCr to greater than or equal to 1.5 times baseline,   which is known or presumed to have occurred within the prior 7 days; or  -Urine volume < 0.5ml/kg/h for 6 hours  Options provided:  -- Acute kidney failure  -- Acute kidney failure with acute tubular necrosis  -- Acute kidney injury  -- Other - I will add my own diagnosis  -- Disagree - Not applicable / Not valid  -- Disagree - Clinically unable to determine / Unknown  -- Refer to Clinical Documentation Reviewer    PROVIDER RESPONSE TEXT:    This patient has an Acute kidney injury.    Query created by: Jessie Adame on 10/15/2024 9:55 AM      QUERY TEXT:    Pt admitted with scalp hematoma with lacerations as found down with sonorous

## (undated) DEVICE — SYRINGE MED 10ML LUERLOCK TIP W/O SFTY DISP

## (undated) DEVICE — DRAPE,REIN 53X77,STERILE: Brand: MEDLINE

## (undated) DEVICE — PAD,NON-ADHERENT,3X8,STERILE,LF,1/PK: Brand: MEDLINE

## (undated) DEVICE — BLANKET WRM W40.2XL55.9IN IORT LO BODY + MISTRAL AIR

## (undated) DEVICE — APPLICATOR MEDICATED 26 CC SOLUTION HI LT ORNG CHLORAPREP

## (undated) DEVICE — MIXER A07A CEMENT

## (undated) DEVICE — SPONGE LAP W18XL18IN WHT COT 4 PLY FLD STRUNG RADPQ DISP ST

## (undated) DEVICE — DRAPE IRRIG FLD WRM W44XL44IN W/ AORN STD PRTBL INTRATEMP

## (undated) DEVICE — STRIP,CLOSURE,WOUND,MEDI-STRIP,1/2X4: Brand: MEDLINE

## (undated) DEVICE — MARKER,SKIN,WI/RULER AND LABELS: Brand: MEDLINE

## (undated) DEVICE — BONE TAMP KIT KEX152EB FF E2 15/2 OI: Brand: KYPHON EXPRESS II KYPHOPAK TRAY

## (undated) DEVICE — DRESSING TRNSPAR W2XL2.75IN FLM SHT SEMIPERMEABLE WIND

## (undated) DEVICE — 3M™ STERI-STRIP™ COMPOUND BENZOIN TINCTURE 40 BAGS/CARTON 4 CARTONS/CASE C1544: Brand: 3M™ STERI-STRIP™

## (undated) DEVICE — SOLUTION IV IRRIG POUR BRL 0.9% SODIUM CHL 2F7124

## (undated) DEVICE — 3M™ IOBAN™ 2 ANTIMICROBIAL INCISE DRAPE 6650EZ: Brand: IOBAN™ 2

## (undated) DEVICE — NEEDLE SPINAL 22GA L3.5IN SPINOCAN

## (undated) DEVICE — Z INACTIVE USE 2653177 SPONGE GZ W2XL2IN NONWOVEN 4 PLY FASTER WICKING ABIL AVANT

## (undated) DEVICE — BONE TAMP KIT KEX152NB AF E2 15/2: Brand: KYPHON EXPRESS II KYPHOPAK TRAY

## (undated) DEVICE — GOWN,AURORA,NONREINFORCED,LARGE: Brand: MEDLINE

## (undated) DEVICE — COVER,TABLE,60X90,STERILE: Brand: MEDLINE

## (undated) DEVICE — NEEDLE HYPO 25GA L1.5IN BLU POLYPR HUB S STL REG BVL STR

## (undated) DEVICE — BONE BIOPSY DEVICE F07A TAPERED SIZE 2: Brand: MEDTRONIC REUSABLE INSTRUMENTS

## (undated) DEVICE — COVER LT HNDL BLU PLAS

## (undated) DEVICE — JACKSON / MODULAR SPINAL TABLE STYLE POSITIONING KIT - STANDARD: Brand: SOULE MEDICAL

## (undated) DEVICE — GLOVE SURG SZ 8 L12IN FNGR THK11.8MIL KHAKI LTX BEAD CUF LT

## (undated) DEVICE — TOWEL,OR,DSP,ST,BLUE,STD,6/PK,12PK/CS: Brand: MEDLINE

## (undated) DEVICE — COUNTER NDL 10 COUNT HLD 20 FOAM BLK SGL MAG

## (undated) DEVICE — COVER,MAYO STAND,STERILE: Brand: MEDLINE

## (undated) DEVICE — COVER,C-ARM,41X74: Brand: MEDLINE

## (undated) DEVICE — GLOVE SURG 7.5 PF POLYMER WHT STRL SIGN LTX ESSENTIAL LTX

## (undated) DEVICE — SYRINGE A08E KIS INFLATION HP: Brand: KYPHON®  INFLATION SYRINGE

## (undated) DEVICE — DRAPE,LAP,CHOLE,W/TROUGHS,STERILE: Brand: MEDLINE

## (undated) DEVICE — INTENDED FOR TISSUE SEPARATION, AND OTHER PROCEDURES THAT REQUIRE A SHARP SURGICAL BLADE TO PUNCTURE OR CUT.: Brand: BARD-PARKER ® CARBON RIB-BACK BLADES